# Patient Record
Sex: MALE | Race: WHITE | NOT HISPANIC OR LATINO | Employment: FULL TIME | URBAN - METROPOLITAN AREA
[De-identification: names, ages, dates, MRNs, and addresses within clinical notes are randomized per-mention and may not be internally consistent; named-entity substitution may affect disease eponyms.]

---

## 2023-06-08 PROBLEM — I24.9 ACS (ACUTE CORONARY SYNDROME): Status: ACTIVE | Noted: 2023-06-08

## 2023-06-08 PROBLEM — I35.1 AORTIC REGURGITATION: Status: ACTIVE | Noted: 2023-06-08

## 2023-06-08 PROBLEM — I35.9 AORTIC VALVE DISEASE: Status: ACTIVE | Noted: 2023-06-08

## 2023-06-08 PROBLEM — R73.09 ELEVATED HEMOGLOBIN A1C: Status: ACTIVE | Noted: 2023-06-08

## 2023-06-08 PROBLEM — I71.21 ASCENDING AORTIC ANEURYSM: Status: ACTIVE | Noted: 2023-06-08

## 2023-06-08 PROBLEM — E11.9 TYPE 2 DIABETES MELLITUS: Status: ACTIVE | Noted: 2023-06-08

## 2023-06-09 ENCOUNTER — ANESTHESIA EVENT (OUTPATIENT)
Dept: PERIOP | Facility: HOSPITAL | Age: 48
End: 2023-06-09
Payer: COMMERCIAL

## 2023-06-09 PROBLEM — Z91.89 AT RISK FOR OBSTRUCTIVE SLEEP APNEA: Status: ACTIVE | Noted: 2023-06-09

## 2023-06-09 PROBLEM — E78.2 MIXED HYPERLIPIDEMIA: Status: ACTIVE | Noted: 2023-06-09

## 2023-06-09 PROBLEM — E66.813 CLASS 3 SEVERE OBESITY IN ADULT: Status: ACTIVE | Noted: 2023-06-09

## 2023-06-09 PROBLEM — I44.2 COMPLETE HEART BLOCK: Status: ACTIVE | Noted: 2023-06-09

## 2023-06-09 PROBLEM — E66.01 CLASS 3 SEVERE OBESITY IN ADULT: Status: ACTIVE | Noted: 2023-06-09

## 2023-06-12 ENCOUNTER — ANESTHESIA (OUTPATIENT)
Dept: PERIOP | Facility: HOSPITAL | Age: 48
End: 2023-06-12
Payer: COMMERCIAL

## 2023-06-12 PROBLEM — Z95.2 S/P AVR: Status: ACTIVE | Noted: 2023-06-12

## 2023-06-12 PROCEDURE — 25010000002 MIDAZOLAM PER 1 MG: Performed by: ANESTHESIOLOGY

## 2023-06-12 PROCEDURE — 25010000002 PIPERACILLIN SOD-TAZOBACTAM PER 1 G: Performed by: THORACIC SURGERY (CARDIOTHORACIC VASCULAR SURGERY)

## 2023-06-12 PROCEDURE — 93318 ECHO TRANSESOPHAGEAL INTRAOP: CPT | Performed by: ANESTHESIOLOGY

## 2023-06-12 PROCEDURE — 25010000002 PROTAMINE SULFATE PER 10 MG: Performed by: ANESTHESIOLOGY

## 2023-06-12 PROCEDURE — 25010000002 HEPARIN (PORCINE) PER 1000 UNITS: Performed by: ANESTHESIOLOGY

## 2023-06-12 PROCEDURE — 25010000002 FENTANYL CITRATE (PF) 1000 MCG/20ML SOLUTION: Performed by: ANESTHESIOLOGY

## 2023-06-12 PROCEDURE — C1751 CATH, INF, PER/CENT/MIDLINE: HCPCS | Performed by: ANESTHESIOLOGY

## 2023-06-12 PROCEDURE — 25010000002 CEFAZOLIN PER 500 MG: Performed by: PHYSICIAN ASSISTANT

## 2023-06-12 PROCEDURE — 25010000002 PROPOFOL 10 MG/ML EMULSION: Performed by: ANESTHESIOLOGY

## 2023-06-12 RX ORDER — PROTAMINE SULFATE 10 MG/ML
INJECTION, SOLUTION INTRAVENOUS AS NEEDED
Status: DISCONTINUED | OUTPATIENT
Start: 2023-06-12 | End: 2023-06-12 | Stop reason: SURG

## 2023-06-12 RX ORDER — CALCIUM CHLORIDE 100 MG/ML
INJECTION INTRAVENOUS; INTRAVENTRICULAR AS NEEDED
Status: DISCONTINUED | OUTPATIENT
Start: 2023-06-12 | End: 2023-06-12 | Stop reason: SURG

## 2023-06-12 RX ORDER — MIDAZOLAM HYDROCHLORIDE 1 MG/ML
INJECTION INTRAMUSCULAR; INTRAVENOUS AS NEEDED
Status: DISCONTINUED | OUTPATIENT
Start: 2023-06-12 | End: 2023-06-12 | Stop reason: SURG

## 2023-06-12 RX ORDER — FENTANYL CITRATE 0.05 MG/ML
INJECTION, SOLUTION INTRAMUSCULAR; INTRAVENOUS AS NEEDED
Status: DISCONTINUED | OUTPATIENT
Start: 2023-06-12 | End: 2023-06-12 | Stop reason: SURG

## 2023-06-12 RX ORDER — VECURONIUM BROMIDE 20 MG/20ML
INJECTION, POWDER, LYOPHILIZED, FOR SOLUTION INTRAVENOUS AS NEEDED
Status: DISCONTINUED | OUTPATIENT
Start: 2023-06-12 | End: 2023-06-12 | Stop reason: SURG

## 2023-06-12 RX ORDER — GLYCOPYRROLATE 0.2 MG/ML
INJECTION INTRAMUSCULAR; INTRAVENOUS AS NEEDED
Status: DISCONTINUED | OUTPATIENT
Start: 2023-06-12 | End: 2023-06-12 | Stop reason: SURG

## 2023-06-12 RX ORDER — PROPOFOL 10 MG/ML
VIAL (ML) INTRAVENOUS AS NEEDED
Status: DISCONTINUED | OUTPATIENT
Start: 2023-06-12 | End: 2023-06-12 | Stop reason: SURG

## 2023-06-12 RX ORDER — EPHEDRINE SULFATE 50 MG/ML
INJECTION INTRAVENOUS AS NEEDED
Status: DISCONTINUED | OUTPATIENT
Start: 2023-06-12 | End: 2023-06-12 | Stop reason: SURG

## 2023-06-12 RX ORDER — AMINOCAPROIC ACID 250 MG/ML
INJECTION, SOLUTION INTRAVENOUS AS NEEDED
Status: DISCONTINUED | OUTPATIENT
Start: 2023-06-12 | End: 2023-06-12 | Stop reason: SURG

## 2023-06-12 RX ORDER — ROCURONIUM BROMIDE 10 MG/ML
INJECTION, SOLUTION INTRAVENOUS AS NEEDED
Status: DISCONTINUED | OUTPATIENT
Start: 2023-06-12 | End: 2023-06-12 | Stop reason: SURG

## 2023-06-12 RX ORDER — SUCCINYLCHOLINE/SOD CL,ISO/PF 200MG/10ML
SYRINGE (ML) INTRAVENOUS AS NEEDED
Status: DISCONTINUED | OUTPATIENT
Start: 2023-06-12 | End: 2023-06-12 | Stop reason: SURG

## 2023-06-12 RX ORDER — HEPARIN SODIUM 1000 [USP'U]/ML
INJECTION, SOLUTION INTRAVENOUS; SUBCUTANEOUS AS NEEDED
Status: DISCONTINUED | OUTPATIENT
Start: 2023-06-12 | End: 2023-06-12 | Stop reason: SURG

## 2023-06-12 RX ORDER — ETOMIDATE 2 MG/ML
INJECTION INTRAVENOUS AS NEEDED
Status: DISCONTINUED | OUTPATIENT
Start: 2023-06-12 | End: 2023-06-12 | Stop reason: SURG

## 2023-06-12 RX ORDER — NOREPINEPHRINE BITARTRATE 0.03 MG/ML
INJECTION, SOLUTION INTRAVENOUS CONTINUOUS PRN
Status: DISCONTINUED | OUTPATIENT
Start: 2023-06-12 | End: 2023-06-12 | Stop reason: SURG

## 2023-06-12 RX ORDER — SODIUM CHLORIDE 9 MG/ML
INJECTION, SOLUTION INTRAVENOUS CONTINUOUS PRN
Status: DISCONTINUED | OUTPATIENT
Start: 2023-06-12 | End: 2023-06-12 | Stop reason: SURG

## 2023-06-12 RX ADMIN — ROCURONIUM BROMIDE 95 MG: 10 SOLUTION INTRAVENOUS at 13:41

## 2023-06-12 RX ADMIN — TAZOBACTAM SODIUM AND PIPERACILLIN SODIUM 4.5 G: 500; 4 INJECTION, SOLUTION INTRAVENOUS at 18:46

## 2023-06-12 RX ADMIN — PROPOFOL 25 MCG/KG/MIN: 10 INJECTION, EMULSION INTRAVENOUS at 18:37

## 2023-06-12 RX ADMIN — FENTANYL CITRATE 250 MCG: 0.05 INJECTION, SOLUTION INTRAMUSCULAR; INTRAVENOUS at 15:12

## 2023-06-12 RX ADMIN — Medication 200 MG: at 13:22

## 2023-06-12 RX ADMIN — PROTAMINE SULFATE 550 MG: 10 INJECTION, SOLUTION INTRAVENOUS at 18:13

## 2023-06-12 RX ADMIN — HEPARIN SODIUM 56000 UNITS: 1000 INJECTION, SOLUTION INTRAVENOUS; SUBCUTANEOUS at 15:05

## 2023-06-12 RX ADMIN — ETOMIDATE 20 MG: 20 INJECTION, SOLUTION INTRAVENOUS at 13:22

## 2023-06-12 RX ADMIN — LIDOCAINE HYDROCHLORIDE 100 MG: 20 INJECTION INTRAVENOUS at 13:22

## 2023-06-12 RX ADMIN — ROCURONIUM BROMIDE 5 MG: 10 SOLUTION INTRAVENOUS at 13:22

## 2023-06-12 RX ADMIN — MIDAZOLAM 1 MG: 1 INJECTION INTRAMUSCULAR; INTRAVENOUS at 18:38

## 2023-06-12 RX ADMIN — FENTANYL CITRATE 250 MCG: 0.05 INJECTION, SOLUTION INTRAMUSCULAR; INTRAVENOUS at 18:38

## 2023-06-12 RX ADMIN — VECURONIUM BROMIDE 4 MG: 1 INJECTION, POWDER, LYOPHILIZED, FOR SOLUTION INTRAVENOUS at 18:59

## 2023-06-12 RX ADMIN — NOREPINEPHRINE BITARTRATE 0.02 MCG/KG/MIN: 0.03 INJECTION, SOLUTION INTRAVENOUS at 13:46

## 2023-06-12 RX ADMIN — PROPOFOL 50 MG: 10 INJECTION, EMULSION INTRAVENOUS at 13:22

## 2023-06-12 RX ADMIN — EPHEDRINE SULFATE 5 MG: 50 INJECTION INTRAVENOUS at 18:21

## 2023-06-12 RX ADMIN — MIDAZOLAM 2 MG: 1 INJECTION INTRAMUSCULAR; INTRAVENOUS at 13:16

## 2023-06-12 RX ADMIN — GLYCOPYRROLATE 0.4 MG: 0.4 INJECTION INTRAMUSCULAR; INTRAVENOUS at 13:22

## 2023-06-12 RX ADMIN — EPHEDRINE SULFATE 10 MG: 50 INJECTION INTRAVENOUS at 13:45

## 2023-06-12 RX ADMIN — EPHEDRINE SULFATE 5 MG: 50 INJECTION INTRAVENOUS at 13:57

## 2023-06-12 RX ADMIN — CEFAZOLIN 3 G: 10 INJECTION, POWDER, FOR SOLUTION INTRAVENOUS at 19:08

## 2023-06-12 RX ADMIN — CEFAZOLIN 3 G: 10 INJECTION, POWDER, FOR SOLUTION INTRAVENOUS at 13:40

## 2023-06-12 RX ADMIN — CALCIUM CHLORIDE 1 G: 100 INJECTION INTRAVENOUS; INTRAVENTRICULAR at 18:14

## 2023-06-12 RX ADMIN — ROCURONIUM BROMIDE 20 MG: 10 SOLUTION INTRAVENOUS at 18:01

## 2023-06-12 RX ADMIN — ATROPINE SULFATE 0.2 MG: 0.4 INJECTION, SOLUTION INTRAVENOUS at 13:57

## 2023-06-12 RX ADMIN — GLYCOPYRROLATE 0.4 MG: 0.4 INJECTION INTRAMUSCULAR; INTRAVENOUS at 13:50

## 2023-06-12 RX ADMIN — AMINOCAPROIC ACID 10 G: 250 INJECTION, SOLUTION INTRAVENOUS at 13:41

## 2023-06-12 RX ADMIN — EPHEDRINE SULFATE 10 MG: 50 INJECTION INTRAVENOUS at 18:11

## 2023-06-12 RX ADMIN — SODIUM CHLORIDE: 9 INJECTION, SOLUTION INTRAVENOUS at 13:11

## 2023-06-12 RX ADMIN — ROCURONIUM BROMIDE 80 MG: 10 SOLUTION INTRAVENOUS at 15:11

## 2023-06-12 RX ADMIN — AMINOCAPROIC ACID 10 G: 250 INJECTION, SOLUTION INTRAVENOUS at 18:29

## 2023-06-12 RX ADMIN — EPHEDRINE SULFATE 10 MG: 50 INJECTION INTRAVENOUS at 18:52

## 2023-06-12 RX ADMIN — INSULIN HUMAN 1.1 UNITS/HR: 1 INJECTION, SOLUTION INTRAVENOUS at 13:52

## 2023-06-12 RX ADMIN — EPHEDRINE SULFATE 15 MG: 50 INJECTION INTRAVENOUS at 13:24

## 2023-06-12 RX ADMIN — FENTANYL CITRATE 250 MCG: 0.05 INJECTION, SOLUTION INTRAMUSCULAR; INTRAVENOUS at 13:22

## 2023-06-12 RX ADMIN — EPHEDRINE SULFATE 5 MG: 50 INJECTION INTRAVENOUS at 17:51

## 2023-06-12 RX ADMIN — EPHEDRINE SULFATE 5 MG: 50 INJECTION INTRAVENOUS at 17:59

## 2023-06-12 RX ADMIN — VANCOMYCIN 2 GRAM/500 ML IN 0.9 % SODIUM CHLORIDE INTRAVENOUS 2000 MG: at 16:27

## 2023-06-12 RX ADMIN — MIDAZOLAM 2 MG: 1 INJECTION INTRAMUSCULAR; INTRAVENOUS at 15:18

## 2023-06-12 RX ADMIN — ATROPINE SULFATE 0.2 MG: 0.4 INJECTION, SOLUTION INTRAVENOUS at 18:50

## 2023-06-12 RX ADMIN — TAZOBACTAM SODIUM AND PIPERACILLIN SODIUM 4.5 G: 500; 4 INJECTION, SOLUTION INTRAVENOUS at 16:29

## 2023-06-12 RX ADMIN — FENTANYL CITRATE 250 MCG: 0.05 INJECTION, SOLUTION INTRAMUSCULAR; INTRAVENOUS at 14:08

## 2023-06-12 NOTE — ANESTHESIA PROCEDURE NOTES
Intra-Op Anesthesia [facility]    Procedure Performed: Intra-Op Anesthesia [facility]ENA       Start Time:  6/12/2023 1:45 AM       End Time:      Preanesthesia Checklist:  Patient identified, IV assessed, risks and benefits discussed, monitors and equipment assessed, procedure being performed at surgeon's request and anesthesia consent obtained.    General Procedure Information  Diagnostic Indications for Echo:  assessment of surgical repair  Physician Requesting Echo: Bud Sibley MD  Location performed:  OR  Intubated  Bite block placed  Heart visualized  Probe Insertion:  Easy  Probe Type:  Multiplane  Modalities:  Color flow mapping and pulse wave Doppler    Echocardiographic and Doppler Measurements    Ventricles    Right Ventricle:  Cavity size normal.  Hypertrophy not present.  Thrombus not present.  Global function normal.    Left Ventricle:  Cavity size normal.  Thrombus not present.  Global Function normal.          Valves    Aortic Valve:  Annulus calcified.  Stenosis severe.  Area: 1.15 cm².  Mean Gradient: 92/54 mmHg.  Regurgitation moderate.  Leaflets calcified, bicuspid and thickened.  Leaflet motions restricted.      Mitral Valve:  Annulus normal.  Stenosis not present.  Regurgitation mild.  Leaflets normal.  Leaflet motions normal.      Tricuspid Valve:  Annulus normal.  Stenosis not present.  Regurgitation mild.  Leaflets normal.  Leaflet motions normal.    Pulmonic Valve:  Annulus normal.  Stenosis not present.  Regurgitation trace.  Leaflets calcified.        Aorta    Ascending Aorta:  Size dilated.  Dissection not present.  Plaque thickness less than 3 mm.  Mobile plaque not present.    Aortic Arch:  Size normal.  Dissection not present.  Plaque thickness less than 3 mm.  Mobile plaque not present.    Descending Aorta:  Size normal.  Dissection not present.  Plaque thickness less than 3 mm.  Mobile plaque not present.        Atria    Right Atrium:  Size normal.  Spontaneous echo contrast not present.   Thrombus not present.  Tumor not present.  Device not present.      Left Atrium:  Size normal.  Spontaneous echo contrast not present.  Thrombus not present.  Tumor not present.  Device not present.    Left atrial appendage normal.      Septa        Ventricular Septum:  Intra-ventricular septum morphology normal.          Other Findings  Pericardium:  normal  Pleural Effusion:  none  Pulmonary Venous Flow:  normal and blunted (decreased) systolic flow    Anesthesia Information  Performed Personally  Anesthesiologist:  Samantha Valentine MD      Echocardiogram Comments:       Findings discussed with Dr. Sibley    Prebypass:  Normal RV and LV systolic fxn.  EF 55-60%, mild TR, mild MR, mitral valve chordal calcification noted, bicuspid AV, moderate AI, severe AS, pk/mn gradient 92/54mmHg. CONNOR by continuity 1.15cm2 by VTI. Ascending Aortic aneursym noted measures approximately 5.0cm at just above level of PA    S/P 27mm mechanical AVR with 28mm tube graft  RV and LV systolic fxn preserved.  No changes in TV, MV, trace paravalvular leak, washing jets noted, Pk/mn Gradient through valve 20/10mmHg,  Aorta with evidence of aneursym repair.

## 2023-06-12 NOTE — ANESTHESIA PREPROCEDURE EVALUATION
Anesthesia Evaluation     Patient summary reviewed and Nursing notes reviewed   no history of anesthetic complications:  NPO Solid Status: > 8 hours  NPO Liquid Status: > 8 hours           Airway   Mallampati: II  TM distance: >3 FB  Neck ROM: full  Possible difficult intubation  Dental          Pulmonary    (+) shortness of breath, sleep apnea, decreased breath sounds,   Cardiovascular     ECG reviewed  Rhythm: regular  Rate: normal    (+) dysrhythmias (CHB), WHITNEY, murmur, PVD (Ascending aortic aneurysm), hyperlipidemia,     ROS comment: Interpretation Summary 6/8/23       •  Left ventricular systolic function is normal. Estimated left ventricular EF = 65%  •  Left ventricular wall thickness is consistent with moderate concentric hypertrophy.  •  The left atrial cavity is mild to moderately dilated.  •  Moderate aortic valve regurgitation is present.  •  Severe aortic valve stenosis is present.  Peak gradient 105.4 mmHg, mean gradient 51 mmHg, CONNOR 0.9 cm².  •  There is moderate to severe aortic insufficiency.  •  Calcification of the mitral valve chordae is noted with mild mitral regurgitation.  •  Trace tricuspid regurgitation with normal RVSP.  •  Aneurysmal dilation of the ascending aorta is present (4.7 cm).       6/8/23 Cath  Large ascending aortic aneurysm.   Severe aortic regurgitation.   Angiographically near normal coronary arteries.       Pt wth Complete heart block requiring temporary pacer no B-blocker given/ contraindicated    Neuro/Psych- negative ROS  GI/Hepatic/Renal/Endo    (+) morbid obesity,  diabetes mellitus,   (-) GERD, liver disease, no renal disease, no thyroid disorder    Musculoskeletal     Abdominal   (+) obese,    Substance History      OB/GYN          Other                      Anesthesia Plan    ASA 4     general, Monica and CVL     ([place], [facility], CVC, post op ICU/vent)  intravenous induction   Postoperative Plan: Expected vent after surgery  Anesthetic plan, risks, benefits, and  alternatives have been provided, discussed and informed consent has been obtained with: patient.    Plan discussed with CRNA.        CODE STATUS:    Code Status (Patient has no pulse and is not breathing): CPR (Attempt to Resuscitate)  Medical Interventions (Patient has pulse or is breathing): Full Support  Release to patient: Routine Release

## 2023-06-12 NOTE — ANESTHESIA PROCEDURE NOTES
Arterial Line      Patient reassessed immediately prior to procedure    Patient location during procedure: pre-op  Start time: 6/12/2023 1:18 PM   Line placed for hemodynamic monitoring.  Performed By   Anesthesiologist: Milton Phelps MD   Preanesthetic Checklist  Completed: patient identified, IV checked, site marked, risks and benefits discussed, surgical consent, monitors and equipment checked, pre-op evaluation and timeout performed  Arterial Line Prep    Sterile Tech: cap, gloves and sterile barriers  Prep: ChloraPrep  Patient monitoring: blood pressure monitoring, continuous pulse oximetry and EKG  Arterial Line Procedure   Laterality:left  Location:  radial artery  Catheter size: 20 G   Guidance: ultrasound guided  PROCEDURE NOTE/ULTRASOUND INTERPRETATION.  Using ultrasound guidance the potential vascular sites for insertion of the catheter were visualized to determine the patency of the vessel to be used for vascular access.  After selecting the appropriate site for insertion, the needle was visualized under ultrasound being inserted into the radial artery, followed by ultrasound confirmation of wire and catheter placement. There were no abnormalities seen on ultrasound; an image was taken; and the patient tolerated the procedure with no complications.   Number of attempts: 1  Successful placement: yes Images: still images not obtained  Post Assessment   Dressing Type: line sutured, occlusive dressing applied, secured with tape and wrist guard applied.   Complications no  Circ/Move/Sens Assessment: normal and unchanged.   Patient Tolerance: patient tolerated the procedure well with no apparent complications

## 2023-06-12 NOTE — ANESTHESIA PROCEDURE NOTES
Central Line      Patient reassessed immediately prior to procedure    Patient location during procedure: OR  Start time: 6/12/2023 1:39 PM  Indications: central pressure monitoring, vascular access and MD/Surgeon request  Staff  Anesthesiologist: Samantha Valentine MD  Preanesthetic Checklist  Completed: patient identified, IV checked, site marked, risks and benefits discussed, surgical consent, monitors and equipment checked, pre-op evaluation and timeout performed  Central Line Prep  Sterile Tech:cap, gloves, gown, mask and sterile barriers  Prep: chloraprep  Patient monitoring: blood pressure monitoring, continuous pulse oximetry and EKG  Central Line Procedure  Laterality:left  Location:internal jugular  Catheter Type:MAC  Catheter Size:9 Fr  Guidance:ultrasound guided  PROCEDURE NOTE/ULTRASOUND INTERPRETATION.  Using ultrasound guidance the potential vascular sites for insertion of the catheter were visualized to determine the patency of the vessel to be used for vascular access.  After selecting the appropriate site for insertion, the needle was visualized under ultrasound being inserted into the internal jugular vein, followed by ultrasound confirmation of wire and catheter placement. There were no abnormalities seen on ultrasound; an image was taken; and the patient tolerated the procedure with no complications. Images: still images not obtained  Assessment  Post procedure:biopatch applied, line sutured, occlusive dressing applied and secured with tape  Assessement:blood return through all ports, free fluid flow, Rob Test and chest x-ray ordered  Complications:no  Patient Tolerance:patient tolerated the procedure well with no apparent complications

## 2023-06-12 NOTE — ANESTHESIA PROCEDURE NOTES
Airway  Date/Time: 6/12/2023 1:23 PM  Airway not difficult    General Information and Staff    Patient location during procedure: OR  Anesthesiologist: Samantha Valentine MD    Indications and Patient Condition  Indications for airway management: airway protection    Preoxygenated: yes  Mask difficulty assessment: 0 - not attempted    Final Airway Details  Final airway type: endotracheal airway      Successful airway: ETT  Cuffed: yes   Successful intubation technique: RSI and direct laryngoscopy  Facilitating devices/methods: intubating stylet  Endotracheal tube insertion site: oral  Blade: Fan  Blade size: 4  ETT size (mm): 7.5  Cormack-Lehane Classification: grade I - full view of glottis  Placement verified by: chest auscultation and capnometry   Cuff volume (mL): 10  Measured from: teeth  ETT/EBT  to teeth (cm): 23  Number of attempts at approach: 1  Assessment: lips, teeth, and gum same as pre-op and atraumatic intubation

## 2023-06-13 PROBLEM — N17.9 AKI (ACUTE KIDNEY INJURY): Status: ACTIVE | Noted: 2023-06-13

## 2023-06-13 NOTE — ANESTHESIA POSTPROCEDURE EVALUATION
Patient: Karthikeyan Riggs    Procedure Summary     Date: 06/12/23 Room / Location:  [facility]  /  [facility]    Anesthesia Start: 1311 Anesthesia Stop: 2000    Procedures:       MEDIAN STERNOTOMY, AORTIC VALVE REPLACEMENT (27MM) AND ASCENDING ANEURYSM REPAIR WITH HEMASHIELD GRAFT 28MM, AORTIC VALVE SUBANNULAR ABSCESS INCISION AND DRAINAGE (Chest)      TRANSESOPHAGEAL ECHOCARDIOGRAM WITH ANESTHESIA (Chest) Diagnosis:       Aortic valve insufficiency, etiology of cardiac valve disease unspecified      (Aortic valve insufficiency, etiology of cardiac valve disease unspecified [I35.1])    Surgeons: uBd Sibley MD Provider: Samantha Valentine MD    Anesthesia Type: general, Monica, CVL ASA Status: 4          Anesthesia Type: general, [place], CVL    Vitals  Vitals Value Taken Time   /65 06/12/23 2000   Temp 97.9 °F (36.6 °C) 06/12/23 2000   Pulse 80 06/12/23 2000   Resp     SpO2 98 % 06/12/23 2000           Post Anesthesia Care and Evaluation    Patient location during evaluation: ICU  Patient participation: complete - patient cannot participate  Post-procedure mental status: Sedated.  Pain score: 0  Pain management: adequate    Airway patency: Intubated.  Anesthetic complications: No anesthetic complications  PONV Status: none  Cardiovascular status: acceptable and hemodynamically stable  Respiratory status: acceptable, ETT and ventilator  Hydration status: acceptable  No anesthesia care post op

## 2023-06-14 PROBLEM — G47.33 OSA (OBSTRUCTIVE SLEEP APNEA): Status: ACTIVE | Noted: 2023-06-14

## 2023-06-15 PROBLEM — E87.1 HYPONATREMIA: Status: ACTIVE | Noted: 2023-06-15

## 2023-06-17 PROBLEM — K05.219 PERIODONTAL ABSCESS: Status: ACTIVE | Noted: 2023-06-17

## 2023-07-25 ENCOUNTER — OFFICE VISIT (OUTPATIENT)
Dept: CARDIAC SURGERY | Facility: CLINIC | Age: 48
End: 2023-07-25
Payer: COMMERCIAL

## 2023-07-25 VITALS
SYSTOLIC BLOOD PRESSURE: 128 MMHG | TEMPERATURE: 97.1 F | WEIGHT: 282 LBS | DIASTOLIC BLOOD PRESSURE: 88 MMHG | BODY MASS INDEX: 41.77 KG/M2 | HEIGHT: 69 IN | OXYGEN SATURATION: 99 % | HEART RATE: 87 BPM

## 2023-07-25 DIAGNOSIS — I71.21 ANEURYSM OF ASCENDING AORTA WITHOUT RUPTURE: Primary | ICD-10-CM

## 2023-07-25 DIAGNOSIS — Z95.2 S/P AVR: Primary | ICD-10-CM

## 2023-07-25 DIAGNOSIS — I35.9 AORTIC VALVE DISEASE: ICD-10-CM

## 2023-07-25 PROBLEM — I44.2 COMPLETE HEART BLOCK: Status: RESOLVED | Noted: 2023-06-09 | Resolved: 2023-07-25

## 2023-07-25 PROBLEM — K05.219 PERIODONTAL ABSCESS: Status: RESOLVED | Noted: 2023-06-17 | Resolved: 2023-07-25

## 2023-07-25 PROBLEM — E87.1 HYPONATREMIA: Status: RESOLVED | Noted: 2023-06-15 | Resolved: 2023-07-25

## 2023-07-25 PROBLEM — N17.9 AKI (ACUTE KIDNEY INJURY): Status: RESOLVED | Noted: 2023-06-13 | Resolved: 2023-07-25

## 2023-07-25 PROBLEM — I24.9 ACS (ACUTE CORONARY SYNDROME): Status: RESOLVED | Noted: 2023-06-08 | Resolved: 2023-07-25

## 2023-07-25 RX ORDER — SPIRONOLACTONE 25 MG/1
TABLET ORAL
COMMUNITY
Start: 2023-07-03

## 2023-07-25 RX ORDER — METOPROLOL SUCCINATE 25 MG/1
TABLET, EXTENDED RELEASE ORAL
COMMUNITY
Start: 2023-07-03

## 2023-07-25 RX ORDER — CEFTRIAXONE SODIUM 1 G/1
INJECTION, POWDER, FOR SOLUTION INTRAMUSCULAR; INTRAVENOUS ONCE
COMMUNITY

## 2023-07-25 RX ORDER — WARFARIN SODIUM 5 MG/1
TABLET ORAL
COMMUNITY
Start: 2023-07-20

## 2023-07-25 RX ORDER — SACUBITRIL AND VALSARTAN 97; 103 MG/1; MG/1
TABLET, FILM COATED ORAL
COMMUNITY
Start: 2023-07-03

## 2023-08-09 ENCOUNTER — OFFICE VISIT (OUTPATIENT)
Dept: FAMILY MEDICINE CLINIC | Facility: CLINIC | Age: 48
End: 2023-08-09
Payer: COMMERCIAL

## 2023-08-09 VITALS
WEIGHT: 279 LBS | HEART RATE: 86 BPM | OXYGEN SATURATION: 97 % | SYSTOLIC BLOOD PRESSURE: 110 MMHG | DIASTOLIC BLOOD PRESSURE: 78 MMHG | HEIGHT: 69 IN | BODY MASS INDEX: 41.32 KG/M2

## 2023-08-09 DIAGNOSIS — I71.21 ASCENDING AORTIC ANEURYSM, UNSPECIFIED WHETHER RUPTURED: ICD-10-CM

## 2023-08-09 DIAGNOSIS — I10 BENIGN ESSENTIAL HTN: ICD-10-CM

## 2023-08-09 DIAGNOSIS — Z95.2 S/P AVR: ICD-10-CM

## 2023-08-09 DIAGNOSIS — Z91.89 AT RISK FOR OBSTRUCTIVE SLEEP APNEA: ICD-10-CM

## 2023-08-09 DIAGNOSIS — E11.9 TYPE 2 DIABETES MELLITUS WITHOUT COMPLICATION, WITHOUT LONG-TERM CURRENT USE OF INSULIN: Primary | ICD-10-CM

## 2023-08-09 DIAGNOSIS — Z12.11 SCREENING FOR COLON CANCER: ICD-10-CM

## 2023-08-09 DIAGNOSIS — E66.01 CLASS 3 SEVERE OBESITY DUE TO EXCESS CALORIES WITH SERIOUS COMORBIDITY AND BODY MASS INDEX (BMI) OF 40.0 TO 44.9 IN ADULT: ICD-10-CM

## 2023-08-09 DIAGNOSIS — Z79.899 ENCOUNTER FOR LONG-TERM (CURRENT) USE OF OTHER MEDICATIONS: ICD-10-CM

## 2023-08-09 DIAGNOSIS — E78.2 MIXED HYPERLIPIDEMIA: ICD-10-CM

## 2023-08-09 DIAGNOSIS — I35.9 AORTIC VALVE DISEASE: ICD-10-CM

## 2023-08-09 DIAGNOSIS — D64.9 ANEMIA, UNSPECIFIED TYPE: ICD-10-CM

## 2023-08-09 RX ORDER — DOXYCYCLINE HYCLATE 100 MG/1
100 CAPSULE ORAL
COMMUNITY
Start: 2023-07-28 | End: 2023-08-27

## 2023-08-09 RX ORDER — BUMETANIDE 1 MG/1
1 TABLET ORAL DAILY
Qty: 30 TABLET | Refills: 11 | COMMUNITY
Start: 2023-07-04 | End: 2024-07-03

## 2023-08-09 RX ORDER — ASPIRIN 81 MG/1
81 TABLET, CHEWABLE ORAL DAILY
Qty: 30 TABLET | Refills: 11 | COMMUNITY
Start: 2023-07-04 | End: 2024-07-03

## 2023-08-09 RX ORDER — CEFADROXIL 500 MG/1
CAPSULE ORAL
COMMUNITY
Start: 2023-08-08

## 2023-08-09 NOTE — ASSESSMENT & PLAN NOTE
Recheck labs today.  Annual eye exams encouraged.  Check feet daily.  Proper diet and exercise plan discussed and encouraged.  Patient states he wants to wait and get lab results back before determining starting a diabetes medication.  Risk discussed understood.  Education provided.  Also encouraged him to follow-up regularly with his dentist.  Return to clinic or ED with any issues or concerns.

## 2023-08-09 NOTE — ASSESSMENT & PLAN NOTE
Continue medications. Continue to follow-up with cardiology Mirta Cardona and anticoagulation clinic as scheduled. Education provided. Return to clinic or ED with any issues or concerns.

## 2023-08-09 NOTE — ASSESSMENT & PLAN NOTE
Continue medications. Continue to follow-up with cardiology Mirta Cardona and anticoagulation clinic as scheduled. Education provided.  Goal blood pressure less than 130/90.  He will let his cardiologist know if gets to or above that.  Return to clinic or ED with any issues or concerns.

## 2023-08-09 NOTE — ASSESSMENT & PLAN NOTE
Patient states he has not been tested for sleep apnea and states he will discuss this further when he follows up with his cardiology team in a couple of weeks.  Education provided.  Risks of uncontrolled sleep apnea discussed and understood.  Return to clinic or ED with any issues or concerns.

## 2023-08-09 NOTE — ASSESSMENT & PLAN NOTE
Patient has never had a colonoscopy and knows that he is due.  He denies colonoscopy and Cologuard at this time and understands the risks of not doing it.  He states he will let me know if he changes his mind.  Risk discussed and understood.  Education provided.  Return to clinic or ED with any issues or concerns.

## 2023-08-09 NOTE — PROGRESS NOTES
"Chief Complaint  Establish Care    Subjective          Karthikeyan Ketan Riggs presents to [facility]  History of Present Illness    Patient presents to [facility].  History of aortic valve disease with severe aortic stenosis and moderate aortic insufficiency, type 2 diabetes, hyperlipidemia complete heart block, prior dental abscess.  He has had aortic valve replacement with 27 mm [place] mechanical valve, ascending aortic aneurysm repair with 28 mm Hemashield platinum graft, and I&D of subvalvular abscesses by Dr. Sibley on 6/12/2023.  Postop he experienced complete heart block and needed a permanent pacemaker placed on 6/30.  Underwent tooth extraction at  on 6/29/2023.    Blood work on 8/1/2023 showed a low red blood cell count of 3.67 and a low hemoglobin and hematocrit at 10.8 and 35.9 respectively.  Platelets were elevated at 472.  States no source of blood loss and likely due to his past surgeries.    8/1/2023 sodium was low at 133 and AST was elevated at 43.    6/8/2023 A1c is 6.7.  States he is not on any type of diabetes medications and would like to have blood work rechecked today before starting any.  States his feet are fine with no cuts or sores.    He is on warfarin and states he follows up every 2 weeks at  for his warfarin and INR checks.    Continues to follow-up with infectious disease and states they actually just took his PICC line out yesterday and started him on oral antibiotics and he is doing well.  States he is likely going to be on oral antibiotics for 3 months.  Has follow-up appointment with infectious disease this month as well as with his cardiologist this month.    No dizziness no headache no chest pain no chest pressure no shortness of breath no trouble breathing no urinary or bowel issues.  Overall states he is doing well.    Objective   Vital Signs:   /78   Pulse 86   Ht 175.3 cm (69\")   Wt 127 kg (279 lb)   SpO2 97%   BMI 41.20 " kg/mý     Body mass index is 41.2 kg/mý.    Review of Systems   Constitutional:  Negative for chills, fatigue and fever.   Eyes:  Negative for visual disturbance.   Respiratory:  Negative for cough, shortness of breath and wheezing.    Cardiovascular: Negative.    Gastrointestinal:  Negative for abdominal pain, diarrhea, nausea and vomiting.   Genitourinary:  Negative for decreased urine volume, dysuria, frequency, hematuria and urgency.   Musculoskeletal:  Negative for back pain.   Skin:  Negative for color change, rash and bruise.   Neurological:  Negative for dizziness, weakness, light-headedness, numbness and headache.   Psychiatric/Behavioral:  Negative for suicidal ideas.      Past History:  Medical History: has a past medical history of Complete heart block s/p temporary pacemaker wire placement 6/9/2023 (06/09/2023), Hyponatremia (06/15/2023), Periodontal abscess (06/17/2023), Postoperative LESLIE  (06/13/2023), and Suspected ACS but near normal coronary arteries on [facility] 6/8/2023 (06/08/2023).   Surgical History: has a past surgical history that includes Cardiac catheterization (N/A, 6/8/2023); Cardiac electrophysiology procedure (N/A, 6/9/2023); Aortic valve replacement (N/A, 6/12/2023); and transesophageal echocardiogram ([place]) (N/A, 6/12/2023).   Family History: family history includes Alcohol abuse in his paternal grandmother; Drug abuse in his brother, father, and mother.   Social History: reports that he has never smoked. He has never used smokeless tobacco. He reports current alcohol use. He reports that he does not use drugs.    PHQ-2 Depression Screening  Little interest or pleasure in doing things? 0-->not at all   Feeling down, depressed, or hopeless? 0-->not at all   PHQ-2 Total Score 0        PHQ-9 Depression Screening  Little interest or pleasure in doing things? 0-->not at all   Feeling down, depressed, or hopeless? 0-->not at all   Trouble falling or staying asleep, or sleeping too much?      Feeling tired or having little energy?     Poor appetite or overeating?     Feeling bad about yourself - or that you are a failure or have let yourself or your family down?     Trouble concentrating on things, such as reading the newspaper or watching television?     Moving or speaking so slowly that other people could have noticed? Or the opposite - being so fidgety or restless that you have been moving around a lot more than usual?     Thoughts that you would be better off dead, or of hurting yourself in some way?     PHQ-9 Total Score 0   If you checked off any problems, how difficult have these problems made it for you to do your work, take care of things at home, or get along with other people?       PHQ-9 Total Score: 0      Patient screened positive for depression based on a PHQ-9 score of  on . Follow-up recommendations include:          Current Outpatient Medications:     aspirin 81 MG chewable tablet, Chew 1 tablet Daily., Disp: 30 tablet, Rfl: 11    bumetanide (BUMEX) 1 MG tablet, Take 1 tablet by mouth Daily., Disp: 30 tablet, Rfl: 11    cefadroxil (DURICEF) 500 MG capsule, , Disp: , Rfl:     dicloxacillin (DYNAPEN) 500 MG capsule, Take 2 capsules by mouth 4 (Four) Times a Day., Disp: 240 capsule, Rfl: 0    doxycycline (VIBRAMYCIN) 100 MG capsule, Take 1 capsule by mouth., Disp: , Rfl:     Entresto  MG tablet, , Disp: , Rfl:     melatonin 5 MG tablet tablet, Take 1 tablet by mouth At Night As Needed (sleep)., Disp: , Rfl:     metoprolol succinate XL (TOPROL-XL) 25 MG 24 hr tablet, , Disp: , Rfl:     rosuvastatin (CRESTOR) 10 MG tablet, Take 1 tablet by mouth Every Night., Disp: 90 tablet, Rfl: 3    spironolactone (ALDACTONE) 25 MG tablet, , Disp: , Rfl:     warfarin (COUMADIN) 5 MG tablet, Take 1 tablet by mouth. Friday and Monday 7.5mg, Disp: , Rfl:    (Not in a hospital admission)     Allergies: Patient has no known allergies.    Physical Exam  Constitutional:       Appearance: Normal  appearance.   Eyes:      Conjunctiva/sclera: Conjunctivae normal.      Pupils: Pupils are equal, round, and reactive to light.   Cardiovascular:      Rate and Rhythm: Normal rate and regular rhythm.      Heart sounds: Normal heart sounds.   Pulmonary:      Effort: Pulmonary effort is normal.      Breath sounds: Normal breath sounds.   Neurological:      General: No focal deficit present.      Mental Status: He is alert and oriented to person, place, and time. Mental status is at baseline.   Psychiatric:         Mood and Affect: Mood normal.         Behavior: Behavior normal.         Thought Content: Thought content normal.         Judgment: Judgment normal.        Result Review :                   Assessment and Plan    Diagnoses and all orders for this visit:    1. Type 2 diabetes mellitus without complication, without long-term current use of insulin (Primary)  Assessment & Plan:  Recheck labs today.  Annual eye exams encouraged.  Check feet daily.  Proper diet and exercise plan discussed and encouraged.  Patient states he wants to wait and get lab results back before determining starting a diabetes medication.  Risk discussed understood.  Education provided.  Also encouraged him to follow-up regularly with his dentist.  Return to clinic or ED with any issues or concerns.    Orders:  -     Hemoglobin A1c  -     POC Microalbumin; Future    2. Aortic valve disease with severe AS/moderate AI   Assessment & Plan:  Continue medications. Continue to follow-up with cardiology Mirta Dulce and anticoagulation clinic as scheduled. Education provided. Return to clinic or ED with any issues or concerns.       3. Ascending aortic aneurysm, unspecified whether ruptured  Assessment & Plan:  Continue medications. Continue to follow-up with cardiology Mirta Dulce and anticoagulation clinic as scheduled. Education provided. Return to clinic or ED with any issues or concerns.       4. Mixed hyperlipidemia  Assessment & Plan:  Continue  medications. Continue to follow-up with cardiology Mirta Dulce and anticoagulation clinic as scheduled. Education provided. Return to clinic or ED with any issues or concerns.       5. S/P mechanical AVR with  I&D of subvalvular abscess 6/12/2023  Assessment & Plan:  Continue medications. Continue to follow-up with cardiology Mirta Dulce and anticoagulation clinic as scheduled. Education provided. Return to clinic or ED with any issues or concerns.       6. Class 3 severe obesity due to excess calories with serious comorbidity and body mass index (BMI) of 40.0 to 44.9 in adult    7. At risk for obstructive sleep apnea  Assessment & Plan:  Patient states he has not been tested for sleep apnea and states he will discuss this further when he follows up with his cardiology team in a couple of weeks.  Education provided.  Risks of uncontrolled sleep apnea discussed and understood.  Return to clinic or ED with any issues or concerns.      8. Screening for colon cancer  Assessment & Plan:  Patient has never had a colonoscopy and knows that he is due.  He denies colonoscopy and Cologuard at this time and understands the risks of not doing it.  He states he will let me know if he changes his mind.  Risk discussed and understood.  Education provided.  Return to clinic or ED with any issues or concerns.      9. Anemia, unspecified type  Assessment & Plan:  Labs drawn.  States no source of blood loss.  Education provided.    Orders:  -     CBC & Differential  -     Iron  -     Ferritin  -     Folate  -     Vitamin B12    10. Encounter for long-term (current) use of other medications  -     Comprehensive Metabolic Panel    11. Benign essential HTN  Assessment & Plan:  Continue medications. Continue to follow-up with cardiology Mirta Dulce and anticoagulation clinic as scheduled. Education provided.  Goal blood pressure less than 130/90.  He will let his cardiologist know if gets to or above that.  Return to clinic or ED with any  issues or concerns.     Orders:  -     CBC & Differential  -     POC Urinalysis Dipstick, Automated; Future      Patient was unable to leave a urine sample for UA and microalbumin and states he will do at his next appointment.               Follow Up   Return in about 3 months (around 11/9/2023).  Patient was given instructions and counseling regarding his condition or for health maintenance advice. Please see specific information pulled into the AVS if appropriate.     ETHAN Mane

## 2023-08-10 LAB
ALBUMIN SERPL-MCNC: 4.4 G/DL (ref 4.1–5.1)
ALBUMIN/GLOB SERPL: 1.4 {RATIO} (ref 1.2–2.2)
ALP SERPL-CCNC: 75 IU/L (ref 44–121)
ALT SERPL-CCNC: 21 IU/L (ref 0–44)
AST SERPL-CCNC: 38 IU/L (ref 0–40)
BASOPHILS # BLD AUTO: 0.1 X10E3/UL (ref 0–0.2)
BASOPHILS NFR BLD AUTO: 1 %
BILIRUB SERPL-MCNC: 0.3 MG/DL (ref 0–1.2)
BUN SERPL-MCNC: 13 MG/DL (ref 6–24)
BUN/CREAT SERPL: 15 (ref 9–20)
CALCIUM SERPL-MCNC: 9.8 MG/DL (ref 8.7–10.2)
CHLORIDE SERPL-SCNC: 98 MMOL/L (ref 96–106)
CO2 SERPL-SCNC: 22 MMOL/L (ref 20–29)
CREAT SERPL-MCNC: 0.88 MG/DL (ref 0.76–1.27)
EGFRCR SERPLBLD CKD-EPI 2021: 107 ML/MIN/1.73
EOSINOPHIL # BLD AUTO: 0.2 X10E3/UL (ref 0–0.4)
EOSINOPHIL NFR BLD AUTO: 2 %
ERYTHROCYTE [DISTWIDTH] IN BLOOD BY AUTOMATED COUNT: 14.1 % (ref 11.6–15.4)
FERRITIN SERPL-MCNC: 64 NG/ML (ref 30–400)
FOLATE SERPL-MCNC: 6.1 NG/ML
GLOBULIN SER CALC-MCNC: 3.2 G/DL (ref 1.5–4.5)
GLUCOSE SERPL-MCNC: 111 MG/DL (ref 70–99)
HBA1C MFR BLD: 5.6 % (ref 4.8–5.6)
HCT VFR BLD AUTO: 37.3 % (ref 37.5–51)
HGB BLD-MCNC: 11.9 G/DL (ref 13–17.7)
IMM GRANULOCYTES # BLD AUTO: 0 X10E3/UL (ref 0–0.1)
IMM GRANULOCYTES NFR BLD AUTO: 0 %
IRON SERPL-MCNC: 40 UG/DL (ref 38–169)
LYMPHOCYTES # BLD AUTO: 2.3 X10E3/UL (ref 0.7–3.1)
LYMPHOCYTES NFR BLD AUTO: 28 %
MCH RBC QN AUTO: 29.4 PG (ref 26.6–33)
MCHC RBC AUTO-ENTMCNC: 31.9 G/DL (ref 31.5–35.7)
MCV RBC AUTO: 92 FL (ref 79–97)
MONOCYTES # BLD AUTO: 0.5 X10E3/UL (ref 0.1–0.9)
MONOCYTES NFR BLD AUTO: 6 %
NEUTROPHILS # BLD AUTO: 5.4 X10E3/UL (ref 1.4–7)
NEUTROPHILS NFR BLD AUTO: 63 %
PLATELET # BLD AUTO: 551 X10E3/UL (ref 150–450)
POTASSIUM SERPL-SCNC: 5.1 MMOL/L (ref 3.5–5.2)
PROT SERPL-MCNC: 7.6 G/DL (ref 6–8.5)
RBC # BLD AUTO: 4.05 X10E6/UL (ref 4.14–5.8)
SODIUM SERPL-SCNC: 135 MMOL/L (ref 134–144)
VIT B12 SERPL-MCNC: 392 PG/ML (ref 232–1245)
WBC # BLD AUTO: 8.4 X10E3/UL (ref 3.4–10.8)

## 2023-08-24 ENCOUNTER — LAB (OUTPATIENT)
Dept: FAMILY MEDICINE CLINIC | Facility: CLINIC | Age: 48
End: 2023-08-24
Payer: COMMERCIAL

## 2023-08-24 DIAGNOSIS — E11.9 TYPE 2 DIABETES MELLITUS WITHOUT COMPLICATION, WITHOUT LONG-TERM CURRENT USE OF INSULIN: Primary | ICD-10-CM

## 2023-08-24 DIAGNOSIS — D64.9 ANEMIA, UNSPECIFIED TYPE: Primary | ICD-10-CM

## 2023-08-24 LAB
BILIRUB BLD-MCNC: NEGATIVE MG/DL
CLARITY, POC: CLEAR
COLOR UR: YELLOW
EXPIRATION DATE: NORMAL
EXPIRATION DATE: NORMAL
GLUCOSE UR STRIP-MCNC: NEGATIVE MG/DL
KETONES UR QL: NEGATIVE
LEUKOCYTE EST, POC: NEGATIVE
Lab: NORMAL
Lab: NORMAL
NITRITE UR-MCNC: NEGATIVE MG/ML
PH UR: 6 [PH] (ref 5–8)
POC CREATININE URINE: 300
POC MICROALBUMIN URINE: 150
PROT UR STRIP-MCNC: NEGATIVE MG/DL
RBC # UR STRIP: NEGATIVE /UL
SP GR UR: 1.03 (ref 1–1.03)
UROBILINOGEN UR QL: NORMAL

## 2023-08-25 LAB
BASOPHILS # BLD AUTO: 0.1 X10E3/UL (ref 0–0.2)
BASOPHILS NFR BLD AUTO: 1 %
EOSINOPHIL # BLD AUTO: 0.2 X10E3/UL (ref 0–0.4)
EOSINOPHIL NFR BLD AUTO: 2 %
ERYTHROCYTE [DISTWIDTH] IN BLOOD BY AUTOMATED COUNT: 14 % (ref 11.6–15.4)
HCT VFR BLD AUTO: 40.4 % (ref 37.5–51)
HGB BLD-MCNC: 12.1 G/DL (ref 13–17.7)
IMM GRANULOCYTES # BLD AUTO: 0 X10E3/UL (ref 0–0.1)
IMM GRANULOCYTES NFR BLD AUTO: 0 %
LYMPHOCYTES # BLD AUTO: 3 X10E3/UL (ref 0.7–3.1)
LYMPHOCYTES NFR BLD AUTO: 30 %
MCH RBC QN AUTO: 28.5 PG (ref 26.6–33)
MCHC RBC AUTO-ENTMCNC: 30 G/DL (ref 31.5–35.7)
MCV RBC AUTO: 95 FL (ref 79–97)
MONOCYTES # BLD AUTO: 0.8 X10E3/UL (ref 0.1–0.9)
MONOCYTES NFR BLD AUTO: 8 %
NEUTROPHILS # BLD AUTO: 5.7 X10E3/UL (ref 1.4–7)
NEUTROPHILS NFR BLD AUTO: 59 %
PLATELET # BLD AUTO: 473 X10E3/UL (ref 150–450)
RBC # BLD AUTO: 4.25 X10E6/UL (ref 4.14–5.8)
WBC # BLD AUTO: 9.8 X10E3/UL (ref 3.4–10.8)

## 2023-08-30 DIAGNOSIS — R79.89 ELEVATED PLATELET COUNT: Primary | ICD-10-CM

## 2023-09-04 ENCOUNTER — HOSPITAL ENCOUNTER (OUTPATIENT)
Dept: CT IMAGING | Facility: HOSPITAL | Age: 48
Discharge: HOME | End: 2023-09-04
Admitting: NURSE PRACTITIONER
Payer: COMMERCIAL

## 2023-09-04 DIAGNOSIS — I71.21 ANEURYSM OF ASCENDING AORTA WITHOUT RUPTURE: ICD-10-CM

## 2023-09-04 PROCEDURE — 25510000001 IOPAMIDOL PER 1 ML: Performed by: NURSE PRACTITIONER

## 2023-09-04 PROCEDURE — 71275 CT ANGIOGRAPHY CHEST: CPT

## 2023-09-04 RX ADMIN — IOPAMIDOL 74 ML: 755 INJECTION, SOLUTION INTRAVENOUS at 12:38

## 2023-09-12 ENCOUNTER — OFFICE VISIT (OUTPATIENT)
Dept: CARDIAC SURGERY | Facility: CLINIC | Age: 48
End: 2023-09-12
Payer: COMMERCIAL

## 2023-09-12 VITALS
TEMPERATURE: 97.1 F | BODY MASS INDEX: 42.21 KG/M2 | OXYGEN SATURATION: 99 % | SYSTOLIC BLOOD PRESSURE: 85 MMHG | HEIGHT: 69 IN | DIASTOLIC BLOOD PRESSURE: 65 MMHG | WEIGHT: 285 LBS | HEART RATE: 67 BPM

## 2023-09-12 DIAGNOSIS — Z98.890 S/P ASCENDING AORTIC ANEURYSM REPAIR: Primary | ICD-10-CM

## 2023-09-12 DIAGNOSIS — Z95.2 S/P AVR: ICD-10-CM

## 2023-09-12 DIAGNOSIS — Z86.79 S/P ASCENDING AORTIC ANEURYSM REPAIR: Primary | ICD-10-CM

## 2023-09-12 DIAGNOSIS — I71.21 ANEURYSM OF ASCENDING AORTA WITHOUT RUPTURE: ICD-10-CM

## 2023-09-12 PROBLEM — Z79.899 ENCOUNTER FOR LONG-TERM (CURRENT) USE OF OTHER MEDICATIONS: Status: RESOLVED | Noted: 2023-08-09 | Resolved: 2023-09-12

## 2023-09-12 PROBLEM — D64.9 ANEMIA, UNSPECIFIED: Status: RESOLVED | Noted: 2023-08-09 | Resolved: 2023-09-12

## 2023-09-12 PROBLEM — Z12.11 SCREENING FOR COLON CANCER: Status: RESOLVED | Noted: 2023-08-09 | Resolved: 2023-09-12

## 2023-09-12 PROCEDURE — 99024 POSTOP FOLLOW-UP VISIT: CPT | Performed by: REGISTERED NURSE

## 2023-09-12 RX ORDER — DOXYCYCLINE HYCLATE 100 MG/1
100 CAPSULE ORAL 2 TIMES DAILY
COMMUNITY
Start: 2023-09-05 | End: 2024-01-03

## 2023-09-12 NOTE — PROGRESS NOTES
[facility] Cardiothoracic Surgery Office Follow Up Note    Date of Encounter: 2023     Name: Karthikeyan Riggs  : 1975     Referred By: No ref. provider found  PCP: Jayro Blackwood APRN    Chief Complaint:    Chief Complaint   Patient presents with    Thoracic Aneurysm     1 month follow up s/p AVR and ascending thoracic aneurysm repair 23.       Subjective      History of Present Illness:    It was nice to see Karthikeyan Riggs in follow up.  He is a pleasant 48 y.o. male with PMH significant for family history of heart disease, BMI 42, aortic valve disease with severe aortic stenosis and moderate aortic insufficiency, 4.7 cm ascending aortic aneurysm, type 2 diabetes mellitus, hyperlipidemia on statin therapy, complete heart block, and periodontal abscess.       Patient is s/p aortic valve replacement with 27 mm Saint Samir mechanical valve, ascending aortic aneurysm repair with 28 mm Hemashield platinum graft, and I&D of subvalvular abscess by Dr. Sibley on 2023.  Postoperatively patient experienced complete heart block and needed a permanent pacemaker placed.  This was deferred due to incidental finding of tooth abscess.  ID was consulted for management of antibiotic therapy.  On POD #9 patient was transferred to [facility] for tooth extraction.  Patient underwent tooth extraction on .  While inpatient at [facility] patient also underwent permanent pacemaker placement on .  Patient was discharged home on 7/3/2023, without readmission.  Mr. Riggs was last seen in office on 2023 by myself for initial postop follow up at which time he was recovering well.  Patient presents today for follow up with imaging.  He continues to recover well and denies unusual chest, back, or scapular pain.      Review of Systems:  Review of Systems   Constitutional: Negative. Negative for chills, decreased appetite, diaphoresis, fever, malaise/fatigue, night sweats and  weight loss.   HENT: Negative.  Negative for congestion, hoarse voice, sore throat and stridor.    Cardiovascular:  Positive for dyspnea on exertion. Negative for chest pain, claudication, irregular heartbeat, leg swelling, near-syncope, orthopnea, palpitations, paroxysmal nocturnal dyspnea and syncope.   Respiratory: Negative.  Negative for cough, hemoptysis, shortness of breath, sleep disturbances due to breathing, snoring, sputum production and wheezing.    Hematologic/Lymphatic: Negative.  Negative for adenopathy and bleeding problem. Does not bruise/bleed easily.   Skin: Negative.  Negative for color change, dry skin, itching, poor wound healing and rash.   Musculoskeletal: Negative.  Negative for arthritis, back pain, falls and muscle weakness.   Gastrointestinal: Negative.  Negative for abdominal pain, anorexia, constipation, diarrhea, hematochezia, melena, nausea and vomiting.   Neurological:  Positive for dizziness and light-headedness. Negative for difficulty with concentration, disturbances in coordination, loss of balance, numbness, seizures, vertigo and weakness.   Psychiatric/Behavioral: Negative.  Negative for altered mental status, depression, memory loss and substance abuse. The patient does not have insomnia and is not nervous/anxious.    Allergic/Immunologic: Negative.  Negative for persistent infections.     I have reviewed the following portions of the patient's history: problem list, current medications, allergies, past surgical history, past medical history, past social history, past family history, and ROS and confirm it's accurate.    Allergies:  No Known Allergies    Medications:      Current Outpatient Medications:     aspirin 81 MG chewable tablet, Chew 1 tablet Daily., Disp: 30 tablet, Rfl: 11    bumetanide (BUMEX) 1 MG tablet, Take 1 tablet by mouth Daily., Disp: 30 tablet, Rfl: 11    cefadroxil (DURICEF) 500 MG capsule, , Disp: , Rfl:     doxycycline (VIBRAMYCIN) 100 MG capsule, Take 1  capsule by mouth 2 (Two) Times a Day., Disp: , Rfl:     Entresto  MG tablet, , Disp: , Rfl:     melatonin 5 MG tablet tablet, Take 1 tablet by mouth At Night As Needed (sleep)., Disp: , Rfl:     metoprolol succinate XL (TOPROL-XL) 25 MG 24 hr tablet, , Disp: , Rfl:     rosuvastatin (CRESTOR) 10 MG tablet, Take 1 tablet by mouth Every Night., Disp: 90 tablet, Rfl: 3    spironolactone (ALDACTONE) 25 MG tablet, , Disp: , Rfl:     warfarin (COUMADIN) 5 MG tablet, Take 1 tablet by mouth. Friday and Monday 7.5mg, Disp: , Rfl:     History:   Past Medical History:   Diagnosis Date    Complete heart block s/p temporary pacemaker wire placement 6/9/2023 06/09/2023    Hyponatremia 06/15/2023    Periodontal abscess 06/17/2023    Left mandibular    Postoperative LESLIE  06/13/2023    Suspected ACS but near normal coronary arteries on [facility] 6/8/2023 06/08/2023    [facility] 6-8-23: Angiographically near normal coronary arteries. Large ascending aortic aneurysm. Severe aortic regurgitation.       Past Surgical History:   Procedure Laterality Date    AORTIC VALVE REPAIR/REPLACEMENT N/A 6/12/2023    Procedure: MEDIAN STERNOTOMY, AORTIC VALVE REPLACEMENT (27MM) AND ASCENDING ANEURYSM REPAIR WITH HEMASHIELD GRAFT 28MM, AORTIC VALVE SUBANNULAR ABSCESS INCISION AND DRAINAGE;  Surgeon: Bud Sibley MD;  [facility];  Service: Cardiothoracic;  Laterality: N/A;    CARDIAC CATHETERIZATION N/A 6/8/2023    Procedure: Left Heart Cath;  Surgeon: Glenis Tejeda MD;  Location: [facility] CATH INVASIVE LOC[facility];  Service: Cardiovascular;  Laterality: N/A;    CARDIAC ELECTROPHYSIOLOGY PROCEDURE N/A 6/9/2023    Procedure: Temporary Pacemaker;  Surgeon: Diony Rodriguez DO;  Location: [facility] CATH INVASIVE LOCATION;  Service: Cardiology;  Laterality: N/A;    TRANSESOPHAGEAL ECHOCARDIOGRAM (JENA) N/A 6/12/2023    Procedure: TRANSESOPHAGEAL ECHOCARDIOGRAM WITH ANESTHESIA;  Surgeon: Bud Sibley MD;  [facility]: [facility] OR;  Service: Cardiothoracic;   "Laterality: N/A;       Social History     Socioeconomic History    Marital status: Single    Number of children: 4   Tobacco Use    Smoking status: Never    Smokeless tobacco: Never   Vaping Use    Vaping Use: Never used   Substance and Sexual Activity    Alcohol use: Yes     Comment: rare    Drug use: Never    Sexual activity: Yes     Partners: Female        Family History   Problem Relation Age of Onset    Drug abuse Mother     Drug abuse Father     Drug abuse Brother     Alcohol abuse Paternal Grandmother        Objective     Physical Exam:  Vitals:    09/12/23 1014 09/12/23 1015   BP: 112/72 (!) 85/65   BP Location: Right arm Left arm   Patient Position: Sitting Sitting   Pulse: 67    Temp: 97.1 °F (36.2 °C)    SpO2: 99%    Weight: 129 kg (285 lb)    Height: 175.3 cm (69\")  Comment: patient reports       Body mass index is 42.09 kg/m².    Physical Exam  Vitals reviewed.   Constitutional:       Appearance: Normal appearance.   Eyes:      Pupils: Pupils are equal, round, and reactive to light.   Cardiovascular:      Rate and Rhythm: Normal rate and regular rhythm.      Heart sounds: Normal heart sounds. No murmur heard.  Pulmonary:      Effort: Pulmonary effort is normal.      Breath sounds: Normal breath sounds.   Musculoskeletal:      Right lower leg: No edema.      Left lower leg: No edema.   Skin:     General: Skin is warm and dry.   Neurological:      General: No focal deficit present.      Mental Status: He is alert and oriented to person, place, and time.   Psychiatric:         Mood and Affect: Mood normal.         Behavior: Behavior normal.         Thought Content: Thought content normal.         Judgment: Judgment normal.       Imaging/Labs:  CT Angiogram Chest    Result Date: 9/4/2023  Impression: 1.Evidence of thoracic aortic aneurysm repair and aortic valvular replacement. There is a hematoma along the right lateral margin of the ascending thoracic aorta without evidence of leak/extravasation. " Additional recent postoperative changes as detailed above. Electronically Signed: Stewart Villegas MD  9/4/2023 11:54 AM CDT  Workstation ID: [id number]    CT Angiogram Chest (09/04/2023 12:34)     ..I personally reviewed this report and imaging.    Assessment / Plan      Assessment / Plan:  PMH significant for family history of heart disease, BMI 42, aortic valve disease with severe aortic stenosis and moderate aortic insufficiency, 4.7 cm ascending aortic aneurysm, type 2 diabetes mellitus, hyperlipidemia on statin therapy, complete heart block, and periodontal abscess.      S/p aortic valve replacement.  S/p ascending aortic aneurysm repair.  S/p aortic valve replacement with 27 mm Saint Samir mechanical valve, ascending aortic aneurysm repair with 28 mm Hemashield platinum graft, and I&D of subvalvular abscess by Dr. Sibley on 6/12/2023.   Postoperatively experienced complete heart block.  Pacemaker postponed due to periodontal abscess.  ID was consulted for management of antibiotic therapy.  Transfer to [facility] on POD #9 for tooth extraction.  Reports undergoing tooth extraction on 6/29.  Reports undergoing permanent pacemaker placement on 6/30.  Discharged home on 7/3/2023, without readmission.  Seen in office on 7/25 for initial follow up at which time he was recovering well.   Presents today for follow up with imaging.   Denies unusual chest, back, or scapular pain.   CTA with stable finding as resulted above, ascending aneurysm measured at 4.2 cm.   Reports medication and restriction compliance.    Follow Up:   We will plan for follow up in 1 year with imaging.    Or sooner for any further concerns or worsening sign and symptoms.  Patient encouraged to call the office with any questions or concerns.     Thank you for allowing me to participate in your care.  Best Regards,    ETHAN Solo  [facility] Cardiothoracic Surgery  09/12/23  10:17 EDT

## 2023-11-15 ENCOUNTER — OFFICE VISIT (OUTPATIENT)
Dept: FAMILY MEDICINE CLINIC | Facility: CLINIC | Age: 48
End: 2023-11-15
Payer: COMMERCIAL

## 2023-11-15 VITALS
HEIGHT: 69 IN | BODY MASS INDEX: 42.81 KG/M2 | WEIGHT: 289.06 LBS | OXYGEN SATURATION: 97 % | SYSTOLIC BLOOD PRESSURE: 120 MMHG | DIASTOLIC BLOOD PRESSURE: 78 MMHG | HEART RATE: 78 BPM

## 2023-11-15 DIAGNOSIS — Z12.11 SCREENING FOR COLON CANCER: ICD-10-CM

## 2023-11-15 DIAGNOSIS — Z79.899 ENCOUNTER FOR LONG-TERM (CURRENT) USE OF OTHER MEDICATIONS: ICD-10-CM

## 2023-11-15 DIAGNOSIS — E11.9 TYPE 2 DIABETES MELLITUS WITHOUT COMPLICATION, WITHOUT LONG-TERM CURRENT USE OF INSULIN: Primary | ICD-10-CM

## 2023-11-15 DIAGNOSIS — E66.01 MORBID (SEVERE) OBESITY DUE TO EXCESS CALORIES: ICD-10-CM

## 2023-11-15 DIAGNOSIS — K21.9 GASTROESOPHAGEAL REFLUX DISEASE, UNSPECIFIED WHETHER ESOPHAGITIS PRESENT: ICD-10-CM

## 2023-11-15 RX ORDER — OMEPRAZOLE 40 MG/1
40 CAPSULE, DELAYED RELEASE ORAL DAILY
Qty: 90 CAPSULE | Refills: 1 | Status: SHIPPED | OUTPATIENT
Start: 2023-11-15

## 2023-11-15 RX ORDER — OMEPRAZOLE 20 MG/1
20 CAPSULE, DELAYED RELEASE ORAL 2 TIMES DAILY
COMMUNITY
End: 2023-11-15

## 2023-11-15 RX ORDER — ASPIRIN 81 MG/1
81 TABLET, CHEWABLE ORAL DAILY
COMMUNITY
Start: 2023-10-02 | End: 2023-11-15

## 2023-11-15 NOTE — PATIENT INSTRUCTIONS
Obesity, Adult  Obesity is the condition of having too much total body fat. Being overweight or obese means that your weight is greater than what is considered healthy for your body size. Obesity is determined by a measurement called BMI (body mass index). BMI is an estimate of body fat and is calculated from height and weight. For adults, a BMI of 30 or higher is considered obese.  Obesity can lead to other health concerns and major illnesses, including:  Stroke.  Coronary artery disease (CAD).  Type 2 diabetes.  Some types of cancer, including cancers of the colon, breast, uterus, and gallbladder.  High blood pressure (hypertension).  High cholesterol.  Gallbladder stones.  Obesity can also contribute to:  Osteoarthritis.  Sleep apnea.  Infertility problems.  What are the causes?  Common causes of this condition include:  Eating daily meals that are high in calories, sugar, and fat.  Drinking high amounts of sugar-sweetened beverages, such as soft drinks.  Being born with genes that may make you more likely to become obese.  Having a medical condition that causes obesity, including:  Hypothyroidism.  Polycystic ovarian syndrome (PCOS).  Binge-eating disorder.  Cushing syndrome.  Taking certain medicines, such as steroids, antidepressants, and seizure medicines.  Not being physically active (sedentary lifestyle).  Not getting enough sleep.  What increases the risk?  The following factors may make you more likely to develop this condition:  Having a family history of obesity.  Living in an area with limited access to:  [place], recreation centers, or sidewalks.  Healthy food choices, such as grocery stores and [employer].  What are the signs or symptoms?  The main sign of this condition is having too much body fat.  How is this diagnosed?  This condition is diagnosed based on:  Your BMI. If you are an adult with a BMI of 30 or higher, you are considered obese.  Your waist circumference. This measures the  distance around your waistline.  Your skinfold thickness. Your health care provider may gently pinch a fold of your skin and measure it.  You may have other tests to check for underlying conditions.  How is this treated?  Treatment for this condition often includes changing your lifestyle. Treatment may include some or all of the following:  Dietary changes. This may include developing a healthy meal plan.  Regular physical activity. This may include activity that causes your heart to beat faster (aerobic exercise) and strength training. Work with your health care provider to design an exercise program that works for you.  Medicine to help you lose weight if you are unable to lose one pound a week after six weeks of healthy eating and more physical activity.  Treating conditions that cause the obesity (underlying conditions).  Surgery. Surgical options may include gastric banding and gastric bypass. Surgery may be done if:  Other treatments have not helped to improve your condition.  You have a BMI of 40 or higher.  You have life-threatening health problems related to obesity.  Follow these instructions at home:  Eating and drinking    Follow recommendations from your health care provider about what you eat and drink. Your health care provider may advise you to:  Limit fast food, sweets, and processed snack foods.  Choose low-fat options, such as low-fat milk instead of whole milk.  Eat five or more servings of fruits or vegetables every day.  Choose healthy foods when you eat out.  Keep low-fat snacks available.  Limit sugary drinks, such as soda, fruit juice, sweetened iced tea, and flavored milk.  Drink enough water to keep your urine pale yellow.  Do not follow a fad diet. Fad diets can be unhealthy and even dangerous.  Other healthful choices include:  Eat at home more often. This gives you more control over what you eat.  Learn to read food labels. This will help you understand how much food is considered one  serving.  Learn what a healthy serving size is.  Physical activity  Exercise regularly, as told by your health care provider.  Most adults should get up to 150 minutes of moderate-intensity exercise every week.  Ask your health care provider what types of exercise are safe for you and how often you should exercise.  Warm up and stretch before being active.  Cool down and stretch after being active.  Rest between periods of activity.  Lifestyle  Work with your health care provider and a dietitian to set a weight-loss goal that is healthy and reasonable for you.  Limit your screen time.  Find ways to reward yourself that do not involve food.  Do not drink alcohol if:  Your health care provider tells you not to drink.  You are pregnant, may be pregnant, or are planning to become pregnant.  If you drink alcohol:  Limit how much you have to:  0-1 drink a day for women.  0-2 drinks a day for men.  Know how much alcohol is in your drink. In the [place] one drink equals one 12 oz bottle of beer (355 mL), one 5 oz glass of wine (148 mL), or one 1½ oz glass of hard liquor (44 mL).  General instructions  Keep a weight-loss journal to keep track of the food you eat and how much exercise you get.  Take over-the-counter and prescription medicines only as told by your health care provider.  Take vitamins and supplements only as told by your health care provider.  Consider joining a support group. Your health care provider may be able to recommend a support group.  Pay attention to your mental health as obesity can lead to depression or self esteem issues.  Keep all follow-up visits. This is important.  Contact a health care provider if:  You are unable to meet your weight-loss goal after six weeks of dietary and lifestyle changes.  You have trouble breathing.  Summary  Obesity is the condition of having too much total body fat.  Being overweight or obese means that your weight is greater than what is considered healthy for your body  size.  Work with your health care provider and a dietitian to set a weight-loss goal that is healthy and reasonable for you.  Exercise regularly, as told by your health care provider. Ask your health care provider what types of exercise are safe for you and how often you should exercise.  This information is not intended to replace advice given to you by your health care provider. Make sure you discuss any questions you have with your health care provider.  Document Revised: 07/26/2022 Document Reviewed: 07/26/2022  [employer] Patient Education © 2023 [employer]    Exercising to Lose Weight  Getting regular exercise is important for everyone. It is especially important if you are overweight. Being overweight increases your risk of heart disease, stroke, diabetes, high blood pressure, and several types of cancer. Exercising, and reducing the calories you consume, can help you lose weight and improve fitness and health.  Exercise can be moderate or vigorous intensity. To lose weight, most people need to do a certain amount of moderate or vigorous-intensity exercise each week.  How can exercise affect me?  You lose weight when you exercise enough to burn more calories than you eat. Exercise also reduces body fat and builds muscle. The more muscle you have, the more calories you burn. Exercise also:  Improves mood.  Reduces stress and tension.  Improves your overall fitness, flexibility, and endurance.  Increases bone strength.  Moderate-intensity exercise    Moderate-intensity exercise is any activity that gets you moving enough to burn at least three times more energy (calories) than if you were sitting.  Examples of moderate exercise include:  Walking a mile in 15 minutes.  Doing light yard work.  Biking at an easy pace.  Most people should get at least 150 minutes of moderate-intensity exercise a week to maintain their body weight.  Vigorous-intensity exercise  Vigorous-intensity exercise is any activity that gets  you moving enough to burn at least six times more calories than if you were sitting. When you exercise at this intensity, you should be working hard enough that you are not able to carry on a conversation.  Examples of vigorous exercise include:  Running.  Playing a team sport, such as football, basketball, and soccer.  Jumping rope.  Most people should get at least 75 minutes a week of vigorous exercise to maintain their body weight.  What actions can I take to lose weight?  The amount of exercise you need to lose weight depends on:  Your age.  The type of exercise.  Any health conditions you have.  Your overall physical ability.  Talk to your health care provider about how much exercise you need and what types of activities are safe for you.  Nutrition    Make changes to your diet as told by your health care provider or diet and nutrition specialist (dietitian). This may include:  Eating fewer calories.  Eating more protein.  Eating less unhealthy fats.  Eating a diet that includes fresh fruits and vegetables, whole grains, low-fat dairy products, and lean protein.  Avoiding foods with added fat, salt, and sugar.  Drink plenty of water while you exercise to prevent dehydration or heat stroke.  Activity  Choose an activity that you enjoy and set realistic goals. Your health care provider can help you make an exercise plan that works for you.  Exercise at a moderate or vigorous intensity most days of the week.  The intensity of exercise may vary from person to person. You can tell how intense a workout is for you by paying attention to your breathing and heartbeat. Most people will notice their breathing and heartbeat get faster with more intense exercise.  Do resistance training twice each week, such as:  Push-ups.  Sit-ups.  Lifting weights.  Using resistance bands.  Getting short amounts of exercise can be just as helpful as long, structured periods of exercise. If you have trouble finding time to exercise, try  doing these things as part of your daily routine:  Get up, stretch, and walk around every 30 minutes throughout the day.  Go for a walk during your lunch break.  Park your car farther away from your destination.  If you take public transportation, get off one stop early and walk the rest of the way.  Make phone calls while standing up and walking around.  Take the stairs instead of elevators or escalators.  Wear comfortable clothes and shoes with good support.  Do not exercise so much that you hurt yourself, feel dizzy, or get very short of breath.  Where to find more information  [place] Department of Health and Human Services: www.hhs.gov  Centers for Disease Control and Prevention: www.cdc.gov  Contact a health care provider:  Before starting a new exercise program.  If you have questions or concerns about your weight.  If you have a medical problem that keeps you from exercising.  Get help right away if:  You have any of the following while exercising:  Injury.  Dizziness.  Difficulty breathing or shortness of breath that does not go away when you stop exercising.  Chest pain.  Rapid heartbeat.  These symptoms may represent a serious problem that is an emergency. Do not wait to see if the symptoms will go away. Get medical help right away. Call your local emergency services (911 in the [place] Do not drive yourself to the hospital.  Summary  Getting regular exercise is especially important if you are overweight.  Being overweight increases your risk of heart disease, stroke, diabetes, high blood pressure, and several types of cancer.  Losing weight happens when you burn more calories than you eat.  Reducing the amount of calories you eat, and getting regular moderate or vigorous exercise each week, helps you lose weight.  This information is not intended to replace advice given to you by your health care provider. Make sure you discuss any questions you have with your health care provider.  Document Revised:  02/13/2022 Document Reviewed: 02/13/2022  [employer] Patient Education © 2023 [employer]

## 2023-11-15 NOTE — PROGRESS NOTES
"Chief Complaint  Hypertension, Hyperlipidemia, and Diabetes    Subjective          Karthikeyan Ketan Riggs presents to [facility]  History of Present Illness    Patient presents for 3-month follow-up.  Again, history of aortic valve disease with severe aortic stenosis and moderate aortic insufficiency type 2 diabetes hyperlipidemia complete heart block.  He has had an aortic valve replacement and ascending aortic aneurysm repair and I&D's of valvular abscess by Dr. Sibley.  He continues to follow-up regularly with his cardiologist and infectious disease.  States 2 weeks ago they stopped his antibiotics and he is to follow-up again in 1 week with infectious disease.    He also is having his INR checked every 2 to 4 weeks at .  Doing well on his medications.    Would like to have blood work completed.  History of type 2 diabetes but he has been able to control it with diet.  Also has a history of GERD and states he has been taking 40 of omeprazole for a while and it works well keeping his heartburn and indigestion controlled.  Is asking for refill of this medication to be sent to [facility].    No dizziness no headache no chest pain no chest pressure no shortness of breath no trouble breathing no urinary or bowel issues.    He has never had a colonoscopy.  He does agree to doing a Cologuard so I will get that sent to him.    Objective   Vital Signs:   /78   Pulse 78   Ht 175.3 cm (69\")   Wt 131 kg (289 lb 1 oz)   SpO2 97%   BMI 42.69 kg/m²     Body mass index is 42.69 kg/m².    Review of Systems   Constitutional:  Negative for chills, fatigue and fever.   Eyes:  Negative for visual disturbance.   Respiratory:  Negative for cough, shortness of breath and wheezing.    Cardiovascular: Negative.    Gastrointestinal:  Negative for abdominal pain, diarrhea, nausea and vomiting.   Genitourinary:  Negative for decreased urine volume, dysuria, frequency, hematuria and urgency. "   Musculoskeletal:  Negative for back pain.   Neurological:  Negative for dizziness and headache.       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   Surgical History: has a past surgical history that includes Cardiac catheterization (N/A, 6/8/2023); Cardiac electrophysiology procedure (N/A, 6/9/2023); Aortic valve replacement (N/A, 6/12/2023); and transesophageal echocardiogram (stephenie) (N/A, 6/12/2023).   Family History: family history includes Alcohol abuse in his paternal grandmother; Drug abuse in his brother, father, and mother.   Social History: reports that he has never smoked. He has never used smokeless tobacco. He reports current alcohol use. He reports that he does not use drugs.    PHQ-2 Depression Screening  Little interest or pleasure in doing things?     Feeling down, depressed, or hopeless?     PHQ-2 Total Score          PHQ-9 Depression Screening  Little interest or pleasure in doing things?     Feeling down, depressed, or hopeless?     Trouble falling or staying asleep, or sleeping too much?     Feeling tired or having little energy?     Poor appetite or overeating?     Feeling bad about yourself - or that you are a failure or have let yourself or your family down?     Trouble concentrating on things, such as reading the newspaper or watching television?     Moving or speaking so slowly that other people could have noticed? Or the opposite - being so fidgety or restless that you have been moving around a lot more than usual?     Thoughts that you would be better off dead, or of hurting yourself in some way?     PHQ-9 Total Score     If you checked off any problems, how difficult have these  problems made it for you to do your work, take care of things at home, or get along with other people?       PHQ-9 Total Score:        Patient screened positive for depression based on a PHQ-9 score of 0 on 8/9/2023. Follow-up recommendations include:          Current Outpatient Medications:     aspirin 81 MG chewable tablet, Chew 1 tablet Daily., Disp: 30 tablet, Rfl: 11    bumetanide (BUMEX) 1 MG tablet, Take 1 tablet by mouth Daily. PRN, Disp: 30 tablet, Rfl: 11    Entresto  MG tablet, , Disp: , Rfl:     melatonin 5 MG tablet tablet, Take 1 tablet by mouth At Night As Needed (sleep)., Disp: , Rfl:     metoprolol succinate XL (TOPROL-XL) 25 MG 24 hr tablet, , Disp: , Rfl:     rosuvastatin (CRESTOR) 10 MG tablet, Take 1 tablet by mouth Every Night., Disp: 90 tablet, Rfl: 3    spironolactone (ALDACTONE) 25 MG tablet, , Disp: , Rfl:     warfarin (COUMADIN) 5 MG tablet, Take 1 tablet by mouth. Friday and Monday 7.5mg, Disp: , Rfl:     omeprazole (priLOSEC) 40 MG capsule, Take 1 capsule by mouth Daily., Disp: 90 capsule, Rfl: 1   (Not in a hospital admission)     Allergies: Patient has no known allergies.    Physical Exam  Constitutional:       Appearance: Normal appearance.   Eyes:      Conjunctiva/sclera: Conjunctivae normal.      Pupils: Pupils are equal, round, and reactive to light.   Cardiovascular:      Rate and Rhythm: Normal rate and regular rhythm.      Heart sounds: Normal heart sounds.   Pulmonary:      Effort: Pulmonary effort is normal.      Breath sounds: Normal breath sounds.   Neurological:      General: No focal deficit present.      Mental Status: He is alert and oriented to person, place, and time. Mental status is at baseline.   Psychiatric:         Mood and Affect: Mood normal.         Behavior: Behavior normal.         Thought Content: Thought content normal.         Judgment: Judgment normal.          Result Review :                   Assessment and Plan    Diagnoses and all orders for  this visit:    1. Type 2 diabetes mellitus without complication, without long-term current use of insulin (Primary)  Assessment & Plan:  Labs drawn.  Proper diet and exercise plan discussed and encouraged.  Check feet daily.  Annual eye exams encouraged.  Return to clinic or ED with any issues or concerns.    Orders:  -     Hemoglobin A1c    2. Gastroesophageal reflux disease, unspecified whether esophagitis present  Assessment & Plan:  Continue omeprazole.  He states his cardiologist is aware he is taking this medication and that they are fine with it.  We will send refills.  Labs drawn.  Proper diet and exercise plan discussed and encouraged.  Risk of meds discussed and understood.  Return to clinic or ED with any issues or concerns.    Orders:  -     omeprazole (priLOSEC) 40 MG capsule; Take 1 capsule by mouth Daily.  Dispense: 90 capsule; Refill: 1    3. Encounter for long-term (current) use of other medications  -     CBC & Differential  -     Comprehensive Metabolic Panel  -     Magnesium    4. Screening for colon cancer  Assessment & Plan:  Patient agrees to Cologuard.  We will send Cologuard.  Informed him if he has not received it in the mail in 2 weeks to contact us.  Also informed him to call us after sending it out for the results.  Return to clinic or ED with any issues or concerns.    Orders:  -     Cologuard - Stool, Per Rectum; Future    5. Morbid (severe) obesity due to excess calories              Class 3 Severe Obesity (BMI >=40). Obesity-related health conditions include the following: hypertension, coronary heart disease, diabetes mellitus, dyslipidemias, and GERD. Obesity is improving with treatment. BMI is is above average; BMI management plan is completed. We discussed portion control and increasing exercise.       Follow Up   Return in about 6 months (around 5/15/2024).  Patient was given instructions and counseling regarding his condition or for health maintenance advice. Please see  specific information pulled into the AVS if appropriate.     ETHAN Mane

## 2023-11-15 NOTE — ASSESSMENT & PLAN NOTE
Continue omeprazole.  He states his cardiologist is aware he is taking this medication and that they are fine with it.  We will send refills.  Labs drawn.  Proper diet and exercise plan discussed and encouraged.  Risk of meds discussed and understood.  Return to clinic or ED with any issues or concerns.   50

## 2023-11-15 NOTE — ASSESSMENT & PLAN NOTE
Patient agrees to Cologuard.  We will send Cologuard.  Informed him if he has not received it in the mail in 2 weeks to contact us.  Also informed him to call us after sending it out for the results.  Return to clinic or ED with any issues or concerns.

## 2023-11-15 NOTE — ASSESSMENT & PLAN NOTE
Labs drawn.  Proper diet and exercise plan discussed and encouraged.  Check feet daily.  Annual eye exams encouraged.  Return to clinic or ED with any issues or concerns.

## 2023-11-16 LAB
ALBUMIN SERPL-MCNC: 4.4 G/DL (ref 4.1–5.1)
ALBUMIN/GLOB SERPL: 1.5 {RATIO} (ref 1.2–2.2)
ALP SERPL-CCNC: 88 IU/L (ref 44–121)
ALT SERPL-CCNC: 19 IU/L (ref 0–44)
AST SERPL-CCNC: 26 IU/L (ref 0–40)
BASOPHILS # BLD AUTO: 0 X10E3/UL (ref 0–0.2)
BASOPHILS NFR BLD AUTO: 0 %
BILIRUB SERPL-MCNC: 0.3 MG/DL (ref 0–1.2)
BUN SERPL-MCNC: 23 MG/DL (ref 6–24)
BUN/CREAT SERPL: 18 (ref 9–20)
CALCIUM SERPL-MCNC: 9.6 MG/DL (ref 8.7–10.2)
CHLORIDE SERPL-SCNC: 99 MMOL/L (ref 96–106)
CO2 SERPL-SCNC: 25 MMOL/L (ref 20–29)
CREAT SERPL-MCNC: 1.27 MG/DL (ref 0.76–1.27)
EGFRCR SERPLBLD CKD-EPI 2021: 70 ML/MIN/1.73
EOSINOPHIL # BLD AUTO: 0.1 X10E3/UL (ref 0–0.4)
EOSINOPHIL NFR BLD AUTO: 1 %
ERYTHROCYTE [DISTWIDTH] IN BLOOD BY AUTOMATED COUNT: 15.7 % (ref 11.6–15.4)
GLOBULIN SER CALC-MCNC: 2.9 G/DL (ref 1.5–4.5)
GLUCOSE SERPL-MCNC: 107 MG/DL (ref 70–99)
HBA1C MFR BLD: 6.2 % (ref 4.8–5.6)
HCT VFR BLD AUTO: 43.1 % (ref 37.5–51)
HGB BLD-MCNC: 13.5 G/DL (ref 13–17.7)
IMM GRANULOCYTES # BLD AUTO: 0.1 X10E3/UL (ref 0–0.1)
IMM GRANULOCYTES NFR BLD AUTO: 1 %
LYMPHOCYTES # BLD AUTO: 2.1 X10E3/UL (ref 0.7–3.1)
LYMPHOCYTES NFR BLD AUTO: 20 %
MAGNESIUM SERPL-MCNC: 2.3 MG/DL (ref 1.6–2.3)
MCH RBC QN AUTO: 27.4 PG (ref 26.6–33)
MCHC RBC AUTO-ENTMCNC: 31.3 G/DL (ref 31.5–35.7)
MCV RBC AUTO: 87 FL (ref 79–97)
MONOCYTES # BLD AUTO: 0.7 X10E3/UL (ref 0.1–0.9)
MONOCYTES NFR BLD AUTO: 7 %
NEUTROPHILS # BLD AUTO: 7.4 X10E3/UL (ref 1.4–7)
NEUTROPHILS NFR BLD AUTO: 71 %
PLATELET # BLD AUTO: 424 X10E3/UL (ref 150–450)
POTASSIUM SERPL-SCNC: 4.8 MMOL/L (ref 3.5–5.2)
PROT SERPL-MCNC: 7.3 G/DL (ref 6–8.5)
RBC # BLD AUTO: 4.93 X10E6/UL (ref 4.14–5.8)
SODIUM SERPL-SCNC: 137 MMOL/L (ref 134–144)
WBC # BLD AUTO: 10.4 X10E3/UL (ref 3.4–10.8)

## 2023-12-26 DIAGNOSIS — Z12.11 SCREENING FOR COLON CANCER: Primary | ICD-10-CM

## 2024-02-06 ENCOUNTER — PREP FOR SURGERY (OUTPATIENT)
Dept: OTHER | Facility: HOSPITAL | Age: 49
End: 2024-02-06
Payer: COMMERCIAL

## 2024-02-06 DIAGNOSIS — Z12.11 SCREEN FOR COLON CANCER: Primary | ICD-10-CM

## 2024-02-07 PROBLEM — Z12.11 SCREEN FOR COLON CANCER: Status: ACTIVE | Noted: 2024-02-06

## 2024-03-18 RX ORDER — SODIUM PICOSULFATE, MAGNESIUM OXIDE, AND ANHYDROUS CITRIC ACID 10; 3.5; 12 MG/160ML; G/160ML; G/160ML
350 LIQUID ORAL TAKE AS DIRECTED
Qty: 350 ML | Refills: 0 | Status: CANCELLED | OUTPATIENT
Start: 2024-03-18

## 2024-03-21 ENCOUNTER — TELEPHONE (OUTPATIENT)
Dept: GASTROENTEROLOGY | Facility: CLINIC | Age: 49
End: 2024-03-21
Payer: COMMERCIAL

## 2024-03-21 DIAGNOSIS — Z01.812 PRE-PROCEDURE LAB EXAM: Primary | ICD-10-CM

## 2024-03-21 RX ORDER — SODIUM PICOSULFATE, MAGNESIUM OXIDE, AND ANHYDROUS CITRIC ACID 10; 3.5; 12 MG/160ML; G/160ML; G/160ML
350 LIQUID ORAL TAKE AS DIRECTED
Qty: 350 ML | Refills: 0 | Status: SHIPPED | OUTPATIENT
Start: 2024-03-21

## 2024-03-21 NOTE — TELEPHONE ENCOUNTER
Hub staff attempted to follow warm transfer process and was unsuccessful     Caller: KAITLIN    Relationship to patient: FROM [facility]    Best call back number: [phone]    Patient is needing: PRE ADMISSIONS TESTING NEEDS ORDERS PUT IN FOR PATIENT

## 2024-03-22 ENCOUNTER — PRE-ADMISSION TESTING (OUTPATIENT)
Dept: PREADMISSION TESTING | Facility: HOSPITAL | Age: 49
End: 2024-03-22
Payer: COMMERCIAL

## 2024-03-22 LAB
APTT PPP: 40.2 SECONDS (ref 22–39)
DEPRECATED RDW RBC AUTO: 53 FL (ref 37–54)
ERYTHROCYTE [DISTWIDTH] IN BLOOD BY AUTOMATED COUNT: 14.7 % (ref 12.3–15.4)
HCT VFR BLD AUTO: 46.1 % (ref 37.5–51)
HGB BLD-MCNC: 14.6 G/DL (ref 13–17.7)
INR PPP: 2.46 (ref 0.89–1.12)
MCH RBC QN AUTO: 30.8 PG (ref 26.6–33)
MCHC RBC AUTO-ENTMCNC: 31.7 G/DL (ref 31.5–35.7)
MCV RBC AUTO: 97.3 FL (ref 79–97)
PLATELET # BLD AUTO: 393 10*3/MM3 (ref 140–450)
PMV BLD AUTO: 9.5 FL (ref 6–12)
POTASSIUM SERPL-SCNC: 4.6 MMOL/L (ref 3.5–5.2)
PROTHROMBIN TIME: 26.9 SECONDS (ref 12.2–14.5)
QT INTERVAL: 410 MS
QTC INTERVAL: 457 MS
RBC # BLD AUTO: 4.74 10*6/MM3 (ref 4.14–5.8)
WBC NRBC COR # BLD AUTO: 9.38 10*3/MM3 (ref 3.4–10.8)

## 2024-03-22 PROCEDURE — 93005 ELECTROCARDIOGRAM TRACING: CPT

## 2024-03-22 PROCEDURE — 85610 PROTHROMBIN TIME: CPT

## 2024-03-22 PROCEDURE — 85730 THROMBOPLASTIN TIME PARTIAL: CPT

## 2024-03-22 PROCEDURE — 36415 COLL VENOUS BLD VENIPUNCTURE: CPT

## 2024-03-22 PROCEDURE — 84132 ASSAY OF SERUM POTASSIUM: CPT

## 2024-03-22 PROCEDURE — 93010 ELECTROCARDIOGRAM REPORT: CPT | Performed by: INTERNAL MEDICINE

## 2024-03-22 PROCEDURE — 85027 COMPLETE CBC AUTOMATED: CPT

## 2024-03-22 RX ORDER — WARFARIN SODIUM 5 MG/1
7.5 TABLET ORAL TAKE AS DIRECTED
COMMUNITY

## 2024-03-22 NOTE — PAT
Patient reported his inr/coumadin is monitored through  coumadin clinic and was given instructions by them: last dose of coumadin 3-21-24, bridge with lovenox- 1 dose to be on 3-24-24    Per Anesthesia Request, patient instructed not to take their ACE/ARB medications on the AM of surgery.    [place] scientific pacemaker unable to see report in [facility] that was done on 2-2024 or receive from , note left on chart for endoscopy to check ppm on date of procedure    [facility] infectious disease notified of ESBL infection history in June/july-2023, per pt report he was treated through  infectious disease and cleared.

## 2024-03-26 ENCOUNTER — HOSPITAL ENCOUNTER (OUTPATIENT)
Facility: HOSPITAL | Age: 49
Setting detail: HOSPITAL OUTPATIENT SURGERY
Discharge: HOME | End: 2024-03-26
Attending: INTERNAL MEDICINE | Admitting: INTERNAL MEDICINE
Payer: COMMERCIAL

## 2024-03-26 ENCOUNTER — ANESTHESIA (OUTPATIENT)
Dept: GASTROENTEROLOGY | Facility: HOSPITAL | Age: 49
End: 2024-03-26
Payer: COMMERCIAL

## 2024-03-26 ENCOUNTER — ANESTHESIA EVENT (OUTPATIENT)
Dept: GASTROENTEROLOGY | Facility: HOSPITAL | Age: 49
End: 2024-03-26
Payer: COMMERCIAL

## 2024-03-26 VITALS
WEIGHT: 288 LBS | TEMPERATURE: 97 F | SYSTOLIC BLOOD PRESSURE: 121 MMHG | OXYGEN SATURATION: 97 % | BODY MASS INDEX: 43.65 KG/M2 | RESPIRATION RATE: 16 BRPM | HEIGHT: 68 IN | HEART RATE: 102 BPM | DIASTOLIC BLOOD PRESSURE: 65 MMHG

## 2024-03-26 DIAGNOSIS — Z12.11 SCREEN FOR COLON CANCER: ICD-10-CM

## 2024-03-26 PROCEDURE — S0260 H&P FOR SURGERY: HCPCS | Performed by: INTERNAL MEDICINE

## 2024-03-26 PROCEDURE — 88305 TISSUE EXAM BY PATHOLOGIST: CPT | Performed by: INTERNAL MEDICINE

## 2024-03-26 PROCEDURE — 25010000002 PROPOFOL 10 MG/ML EMULSION: Performed by: NURSE ANESTHETIST, CERTIFIED REGISTERED

## 2024-03-26 PROCEDURE — 25810000003 LACTATED RINGERS PER 1000 ML: Performed by: NURSE ANESTHETIST, CERTIFIED REGISTERED

## 2024-03-26 DEVICE — DEV CLIP ENDO RESOLUTION360 CONTRL ROT 235CM: Type: IMPLANTABLE DEVICE | Site: SIGMOID COLON | Status: FUNCTIONAL

## 2024-03-26 RX ORDER — PHENYLEPHRINE HCL IN 0.9% NACL 1 MG/10 ML
SYRINGE (ML) INTRAVENOUS AS NEEDED
Status: DISCONTINUED | OUTPATIENT
Start: 2024-03-26 | End: 2024-03-26 | Stop reason: SURG

## 2024-03-26 RX ORDER — MIDAZOLAM HYDROCHLORIDE 1 MG/ML
1 INJECTION INTRAMUSCULAR; INTRAVENOUS
Status: CANCELLED | OUTPATIENT
Start: 2024-03-26

## 2024-03-26 RX ORDER — FAMOTIDINE 20 MG/1
20 TABLET, FILM COATED ORAL ONCE
Status: CANCELLED | OUTPATIENT
Start: 2024-03-26 | End: 2024-03-26

## 2024-03-26 RX ORDER — SODIUM CHLORIDE 0.9 % (FLUSH) 0.9 %
10 SYRINGE (ML) INJECTION AS NEEDED
Status: CANCELLED | OUTPATIENT
Start: 2024-03-26

## 2024-03-26 RX ORDER — PROPOFOL 10 MG/ML
VIAL (ML) INTRAVENOUS CONTINUOUS PRN
Status: DISCONTINUED | OUTPATIENT
Start: 2024-03-26 | End: 2024-03-26 | Stop reason: SURG

## 2024-03-26 RX ORDER — SODIUM CHLORIDE, SODIUM LACTATE, POTASSIUM CHLORIDE, CALCIUM CHLORIDE 600; 310; 30; 20 MG/100ML; MG/100ML; MG/100ML; MG/100ML
9 INJECTION, SOLUTION INTRAVENOUS CONTINUOUS
Status: CANCELLED | OUTPATIENT
Start: 2024-03-26

## 2024-03-26 RX ORDER — ONDANSETRON 2 MG/ML
4 INJECTION INTRAMUSCULAR; INTRAVENOUS ONCE AS NEEDED
Status: DISCONTINUED | OUTPATIENT
Start: 2024-03-26 | End: 2024-03-26 | Stop reason: HOSPADM

## 2024-03-26 RX ORDER — SODIUM CHLORIDE, SODIUM LACTATE, POTASSIUM CHLORIDE, CALCIUM CHLORIDE 600; 310; 30; 20 MG/100ML; MG/100ML; MG/100ML; MG/100ML
INJECTION, SOLUTION INTRAVENOUS CONTINUOUS PRN
Status: DISCONTINUED | OUTPATIENT
Start: 2024-03-26 | End: 2024-03-26 | Stop reason: SURG

## 2024-03-26 RX ORDER — IPRATROPIUM BROMIDE AND ALBUTEROL SULFATE 2.5; .5 MG/3ML; MG/3ML
3 SOLUTION RESPIRATORY (INHALATION) ONCE AS NEEDED
Status: DISCONTINUED | OUTPATIENT
Start: 2024-03-26 | End: 2024-03-26 | Stop reason: HOSPADM

## 2024-03-26 RX ORDER — SODIUM CHLORIDE 9 MG/ML
40 INJECTION, SOLUTION INTRAVENOUS AS NEEDED
Status: CANCELLED | OUTPATIENT
Start: 2024-03-26

## 2024-03-26 RX ORDER — FAMOTIDINE 10 MG/ML
20 INJECTION, SOLUTION INTRAVENOUS ONCE
Status: CANCELLED | OUTPATIENT
Start: 2024-03-26 | End: 2024-03-26

## 2024-03-26 RX ORDER — LIDOCAINE HYDROCHLORIDE 10 MG/ML
0.5 INJECTION, SOLUTION EPIDURAL; INFILTRATION; INTRACAUDAL; PERINEURAL ONCE AS NEEDED
Status: CANCELLED | OUTPATIENT
Start: 2024-03-26

## 2024-03-26 RX ORDER — LIDOCAINE HYDROCHLORIDE 10 MG/ML
INJECTION, SOLUTION EPIDURAL; INFILTRATION; INTRACAUDAL; PERINEURAL AS NEEDED
Status: DISCONTINUED | OUTPATIENT
Start: 2024-03-26 | End: 2024-03-26 | Stop reason: SURG

## 2024-03-26 RX ORDER — SODIUM CHLORIDE 0.9 % (FLUSH) 0.9 %
10 SYRINGE (ML) INJECTION EVERY 12 HOURS SCHEDULED
Status: CANCELLED | OUTPATIENT
Start: 2024-03-26

## 2024-03-26 RX ADMIN — Medication 100 MCG: at 12:10

## 2024-03-26 RX ADMIN — SODIUM CHLORIDE, POTASSIUM CHLORIDE, SODIUM LACTATE AND CALCIUM CHLORIDE: 600; 310; 30; 20 INJECTION, SOLUTION INTRAVENOUS at 11:43

## 2024-03-26 RX ADMIN — Medication 200 MCG: at 12:15

## 2024-03-26 RX ADMIN — PROPOFOL 50 MCG/KG/MIN: 10 INJECTION, EMULSION INTRAVENOUS at 11:49

## 2024-03-26 RX ADMIN — LIDOCAINE HYDROCHLORIDE 50 MG: 10 INJECTION, SOLUTION EPIDURAL; INFILTRATION; INTRACAUDAL; PERINEURAL at 11:49

## 2024-03-26 NOTE — H&P
[facility]    Referring Provider: Damaso Vuong MD    Reason for Consultation: + cologuard    Chief complaint here for colonoscopy    History of present illness:  Karthikeyan Riggs is a 49 y.o. male who presents to inpatient endoscopy for colonoscopy.  H/o aortic valve abscess and ascending aortic aneurysm s/p repair 6/2023. + cologuard. Referred for colonoscopy.      Allergies:  Patient has no known allergies.    Scheduled Meds:        Infusions:       PRN Meds:    ipratropium-albuterol    ondansetron    Home Meds:  Medications Prior to Admission   Medication Sig Dispense Refill Last Dose    aspirin 81 MG chewable tablet Chew 1 tablet Daily. 30 tablet 11     bumetanide (BUMEX) 1 MG tablet Take 1 tablet by mouth Daily As Needed (weight of 3lb overnight or weight gain of 5lb in 1 week). PRN 30 tablet 11     Entresto  MG tablet Take 1 tablet by mouth 2 (Two) Times a Day.       melatonin 5 MG tablet tablet Take 1 tablet by mouth At Night As Needed (sleep).       metoprolol succinate XL (TOPROL-XL) 25 MG 24 hr tablet Take 1 tablet by mouth Daily.       omeprazole (priLOSEC) 40 MG capsule Take 1 capsule by mouth Daily. 90 capsule 1     rosuvastatin (CRESTOR) 10 MG tablet Take 1 tablet by mouth Every Night. 90 tablet 3     Sod Picosulfate-Mag Ox-Cit Acd (Clenpiq) 10-3.5-12 MG-GM -GM/160ML solution Take 350 mL by mouth Take As Directed. 350 mL 0     spironolactone (ALDACTONE) 25 MG tablet Take 1 tablet by mouth Daily.       warfarin (COUMADIN) 5 MG tablet Take 1 tablet by mouth Take As Directed. Tuesday, Thursday, Saturday, Sunday       warfarin (COUMADIN) 5 MG tablet Take 1.5 tablets by mouth Take As Directed. Monday, Wednesday, Friday          ROS: Review of Systems  All other systems reviewed and are negative.    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  PAST SURG HX: Pt  has a past surgical history that includes Cardiac catheterization (N/A, 6/8/2023); Cardiac electrophysiology procedure (N/A, 6/9/2023); Aortic valve replacement (N/A, 6/12/2023); and transesophageal echocardiogram (stephenie) (N/A, 6/12/2023).  FAM HX: family history includes Alcohol abuse in his paternal grandmother; Drug abuse in his brother, father, and mother.  SOC HX: Pt  reports that he has never smoked. He has never used smokeless tobacco. He reports current alcohol use. He reports that he does not use drugs.    There were no vitals taken for this visit.    Physical Exam  Wt Readings from Last 3 Encounters:   11/15/23 131 kg (289 lb 1 oz)   09/12/23 129 kg (285 lb)   08/09/23 127 kg (279 lb)   ,body mass index is unknown because there is no height or weight on file.    General Appearance:  Vitals as above. no acute distress  Head/face:  Normocephalic, atraumatic  Eyes:   EOMI, no conjunctivitis or icterus   Nose/Sinuses:  Nares patent bilaterally without discharge or lesions  Mouth/Throat:  Normal oral movements without dyskinesia  Neck:  trachea is midline, no thyromegaly  Lungs:  Normal work of breathing effort, no overt rales  Heart:  Regular rate, no overt palpable thrill or grade VI M  Abdomen:  Nondistended, no guarding or rebound tenderness  Neurologic:  Alert; no focal deficits; age appropriate behavior and speech  Psychiatric: mood and affect are congruent  Vascular: extremities without edema  Skin: no rash or cyanosis.          Results Review:   I reviewed the patient's new clinical results.    Lab Results   Component Value Date    WBC 9.38 03/22/2024    HGB 14.6  03/22/2024    HCT 46.1 03/22/2024    MCV 97.3 (H) 03/22/2024     03/22/2024       Lab Results   Component Value Date    GLUCOSE 107 (H) 11/15/2023    BUN 23 11/15/2023    CREATININE 1.27 11/15/2023    BCR 18 11/15/2023    CO2 25 11/15/2023    CALCIUM 9.6 11/15/2023    PROTENTOTREF 7.3 11/15/2023    ALBUMIN 4.4 11/15/2023    LABIL2 1.5 11/15/2023    AST 26 11/15/2023    ALT 19 11/15/2023       ASSESSMENTS/PLANS  1.) Cologuard +  2.) h/o aortic valve abscess, ascending aortic aneurysm repair  To colonoscopy  The risk of endoscopy was discussed including the risk of anesthesia, bowel perforation, bleeding, missed lesion, infection, and death should a severe complication occur.  The patient determined that the diagnostic benefit outweighed the risk.             I discussed the patient's findings and my recommendations with the patient    Damaso Vuong MD  03/26/24  11:04 EDT

## 2024-03-26 NOTE — ANESTHESIA PREPROCEDURE EVALUATION
Anesthesia Evaluation     Patient summary reviewed and Nursing notes reviewed   NPO Solid Status: > 8 hours  NPO Liquid Status: > 2 hours           Airway   Mallampati: II  TM distance: >3 FB  Neck ROM: full  No difficulty expected  Dental      Pulmonary    (-) shortness of breath, recent URI, sleep apnea, not a smoker  Cardiovascular   Exercise tolerance: good (4-7 METS)    ECG reviewed  PT is on anticoagulation therapy    (+) pacemaker (leadless recent  implant Cards at [facility]) pacemaker, hypertension, dysrhythmias, CHF Systolic <55%, hyperlipidemia  (-) past MI, CAD, angina, cardiac stents    ROS comment: ECHO POST OP EF 40 - 55%.septal motion consistent with recentprior cardiac surgery.  LA dilated by visual assessment.   Mechanical AV valve (27 mm [place]). The peak16/mean  8 mmHg. (normal for this prosthetic valve).   small pericardial effusion- no tamponade. Left pleural effusion.     Cath 2023 pre op Large ascending aortic aneurysm  Severe AI .No CAD           Neuro/Psych  (-) seizures, CVA  GI/Hepatic/Renal/Endo    (+) obesity, GERD, renal disease (last Creat 2023 was 1.27 K 4.6 this month)- ARF  (-) morbid obesity, liver disease, diabetes    Musculoskeletal     Abdominal    Substance History      OB/GYN          Other        ROS/Med Hx Other: Sub valvular abscess post AVR 2023   Screening Colonoscopy today                 Anesthesia Plan    ASA 3     general     (Full beard   Low EF 40%  2023 post surgical -but suspect higher now )  intravenous induction     Anesthetic plan, risks, benefits, and alternatives have been provided, discussed and informed consent has been obtained with: patient.    Plan discussed with CRNA.      CODE STATUS:

## 2024-03-27 LAB
CYTO UR: NORMAL
LAB AP CASE REPORT: NORMAL
LAB AP CLINICAL INFORMATION: NORMAL
PATH REPORT.FINAL DX SPEC: NORMAL
PATH REPORT.GROSS SPEC: NORMAL

## 2024-04-23 DIAGNOSIS — K21.9 GASTROESOPHAGEAL REFLUX DISEASE, UNSPECIFIED WHETHER ESOPHAGITIS PRESENT: ICD-10-CM

## 2024-04-23 RX ORDER — OMEPRAZOLE 40 MG/1
40 CAPSULE, DELAYED RELEASE ORAL DAILY
Qty: 30 CAPSULE | Refills: 0 | Status: SHIPPED | OUTPATIENT
Start: 2024-04-23

## 2024-07-05 ENCOUNTER — TELEPHONE (OUTPATIENT)
Dept: FAMILY MEDICINE CLINIC | Facility: CLINIC | Age: 49
End: 2024-07-05
Payer: COMMERCIAL

## 2024-07-05 DIAGNOSIS — K21.9 GASTROESOPHAGEAL REFLUX DISEASE, UNSPECIFIED WHETHER ESOPHAGITIS PRESENT: ICD-10-CM

## 2024-07-05 RX ORDER — OMEPRAZOLE 40 MG/1
40 CAPSULE, DELAYED RELEASE ORAL DAILY
Qty: 30 CAPSULE | Refills: 0 | Status: SHIPPED | OUTPATIENT
Start: 2024-07-05

## 2024-07-25 DIAGNOSIS — I35.9 AORTIC VALVE DISEASE: Primary | ICD-10-CM

## 2024-09-05 ENCOUNTER — TELEPHONE (OUTPATIENT)
Dept: CARDIAC SURGERY | Facility: CLINIC | Age: 49
End: 2024-09-05
Payer: COMMERCIAL

## 2024-09-05 NOTE — TELEPHONE ENCOUNTER
Attempted to contact patient in regards to annual follow up appointment with CTA chest. CTA chest not been done yet.     LVM for patient letting him know that we will need to reschedule his appointment if CTA has not been done yet. Asked that he call our office back to let us know.

## 2024-10-18 DIAGNOSIS — K21.9 GASTROESOPHAGEAL REFLUX DISEASE, UNSPECIFIED WHETHER ESOPHAGITIS PRESENT: ICD-10-CM

## 2024-10-18 RX ORDER — OMEPRAZOLE 40 MG/1
40 CAPSULE, DELAYED RELEASE ORAL DAILY
Qty: 30 CAPSULE | Refills: 0 | Status: SHIPPED | OUTPATIENT
Start: 2024-10-18

## 2024-11-12 DIAGNOSIS — K21.9 GASTROESOPHAGEAL REFLUX DISEASE, UNSPECIFIED WHETHER ESOPHAGITIS PRESENT: ICD-10-CM

## 2024-11-12 RX ORDER — OMEPRAZOLE 40 MG/1
40 CAPSULE, DELAYED RELEASE ORAL DAILY
Qty: 30 CAPSULE | Refills: 11 | OUTPATIENT
Start: 2024-11-12

## (undated) DEVICE — ADAPT CLN LUM OLYMP AIR/H20

## (undated) DEVICE — KT ORCA ORCAPOD DISP STRL

## (undated) DEVICE — SINGLE-USE POLYPECTOMY SNARE: Brand: SENSATION SHORT THROW

## (undated) DEVICE — THE SINGLE USE ETRAP – POLYP TRAP IS USED FOR SUCTION RETRIEVAL OF ENDOSCOPICALLY REMOVED POLYPS.: Brand: ETRAP

## (undated) DEVICE — SOL IRR H2O BTL 1000ML STRL

## (undated) DEVICE — SAFELINER SUCTION CANISTER 1000CC: Brand: DEROYAL

## (undated) DEVICE — HYBRID CO2 TUBING/CAP SET FOR OLYMPUS® SCOPES & CO2 SOURCE: Brand: ERBE

## (undated) DEVICE — LUBE GEL ENDOGLIDE 1.1OZ

## (undated) DEVICE — INTRO ACCSR BLNT TP

## (undated) DEVICE — TUBING, SUCTION, 1/4" X 10', STRAIGHT: Brand: MEDLINE

## (undated) DEVICE — CONTN GRAD MEAS TRIANG 32OZ BLK

## (undated) DEVICE — SYR LUERLOK 50ML

## (undated) DEVICE — LUBE JELLY FOIL PACK 1.4 OZ: Brand: MEDLINE INDUSTRIES, INC.

## (undated) DEVICE — FIRST STEP BEDSIDE ADD WATER KIT - RESEALABLE STAND-UP POUCH, ENDOSCOPIC CLEANING PAD - 1 POUCH: Brand: FIRST STEP BEDSIDE ADD WATER KIT - RESEALABLE STAND-UP POUCH, ENDOSCOPIC CLEANIN

## (undated) DEVICE — SOLIDIFIER LIQ PREMISORB 1500CC